# Patient Record
Sex: FEMALE | Race: WHITE | Employment: UNEMPLOYED | URBAN - METROPOLITAN AREA
[De-identification: names, ages, dates, MRNs, and addresses within clinical notes are randomized per-mention and may not be internally consistent; named-entity substitution may affect disease eponyms.]

---

## 2017-01-03 DIAGNOSIS — M62.838 MUSCLE SPASM: ICD-10-CM

## 2017-01-03 NOTE — TELEPHONE ENCOUNTER
Pt would like a refill on the following medication. Please assist.    Requested Prescriptions     Pending Prescriptions Disp Refills    carisoprodol (SOMA) 350 mg tablet 60 Tab 0     Sig: Take 1 Tab by mouth every eight (8) hours as needed for Muscle Spasm(s). Max Daily Amount: 1,050 mg. Indications:  MUSCLE SPASM

## 2017-01-04 RX ORDER — CARISOPRODOL 350 MG/1
350 TABLET ORAL
Qty: 42 TAB | Refills: 0 | Status: SHIPPED | OUTPATIENT
Start: 2017-01-04 | End: 2017-02-07 | Stop reason: SDUPTHER

## 2017-01-05 ENCOUNTER — HOSPITAL ENCOUNTER (OUTPATIENT)
Dept: LAB | Age: 42
Discharge: HOME | End: 2017-01-05
Payer: MEDICAID

## 2017-01-05 DIAGNOSIS — E78.2 MIXED HYPERLIPIDEMIA: ICD-10-CM

## 2017-01-05 LAB
ANION GAP BLD CALC-SCNC: 8 MMOL/L (ref 3–18)
BUN SERPL-MCNC: 8 MG/DL (ref 7–18)
BUN/CREAT SERPL: 9 (ref 12–20)
CALCIUM SERPL-MCNC: 9 MG/DL (ref 8.5–10.1)
CHLORIDE SERPL-SCNC: 111 MMOL/L (ref 100–108)
CHOLEST SERPL-MCNC: 180 MG/DL
CO2 SERPL-SCNC: 24 MMOL/L (ref 21–32)
CREAT SERPL-MCNC: 0.89 MG/DL (ref 0.6–1.3)
GLUCOSE SERPL-MCNC: 79 MG/DL (ref 74–99)
HDLC SERPL-MCNC: 40 MG/DL (ref 40–60)
HDLC SERPL: 4.5 {RATIO} (ref 0–5)
LDLC SERPL CALC-MCNC: 109.4 MG/DL (ref 0–100)
LIPID PROFILE,FLP: ABNORMAL
POTASSIUM SERPL-SCNC: 4.3 MMOL/L (ref 3.5–5.5)
SODIUM SERPL-SCNC: 143 MMOL/L (ref 136–145)
TRIGL SERPL-MCNC: 153 MG/DL (ref ?–150)
VLDLC SERPL CALC-MCNC: 30.6 MG/DL

## 2017-01-05 PROCEDURE — 80061 LIPID PANEL: CPT | Performed by: NURSE PRACTITIONER

## 2017-01-05 PROCEDURE — 80048 BASIC METABOLIC PNL TOTAL CA: CPT | Performed by: NURSE PRACTITIONER

## 2017-01-05 PROCEDURE — 36415 COLL VENOUS BLD VENIPUNCTURE: CPT | Performed by: NURSE PRACTITIONER

## 2017-01-06 ENCOUNTER — TELEPHONE (OUTPATIENT)
Dept: FAMILY MEDICINE CLINIC | Age: 42
End: 2017-01-06

## 2017-01-06 NOTE — TELEPHONE ENCOUNTER
Call placed to Ms. Dhara Min to make her aware of most recent lab results. Discussed results. Continue with simvastatin as prescribed. She has no additional questions or concerns at this time.

## 2017-01-24 ENCOUNTER — HOSPITAL ENCOUNTER (OUTPATIENT)
Dept: LAB | Age: 42
Discharge: HOME | End: 2017-01-24
Payer: MEDICAID

## 2017-01-24 ENCOUNTER — OFFICE VISIT (OUTPATIENT)
Dept: OBGYN CLINIC | Age: 42
End: 2017-01-24

## 2017-01-24 VITALS
HEIGHT: 65 IN | TEMPERATURE: 97.1 F | BODY MASS INDEX: 27.49 KG/M2 | WEIGHT: 165 LBS | SYSTOLIC BLOOD PRESSURE: 115 MMHG | DIASTOLIC BLOOD PRESSURE: 76 MMHG | HEART RATE: 77 BPM

## 2017-01-24 DIAGNOSIS — R10.2 PELVIC PAIN: ICD-10-CM

## 2017-01-24 DIAGNOSIS — N93.9 VAGINAL BLEEDING: ICD-10-CM

## 2017-01-24 DIAGNOSIS — R10.2 PELVIC PAIN: Primary | ICD-10-CM

## 2017-01-24 DIAGNOSIS — S39.93XA VAGINAL INJURY, INITIAL ENCOUNTER: ICD-10-CM

## 2017-01-24 LAB
BILIRUB UR QL STRIP: NEGATIVE
GLUCOSE UR-MCNC: NEGATIVE MG/DL
KETONES P FAST UR STRIP-MCNC: NEGATIVE MG/DL
PH UR STRIP: 5.5 [PH] (ref 4.6–8)
PROT UR QL STRIP: NEGATIVE MG/DL
SP GR UR STRIP: 1.01 (ref 1–1.03)
UA UROBILINOGEN AMB POC: NORMAL (ref 0.2–1)
URINALYSIS CLARITY POC: CLEAR
URINALYSIS COLOR POC: YELLOW
URINE BLOOD POC: NORMAL
URINE LEUKOCYTES POC: NORMAL
URINE NITRITES POC: NEGATIVE

## 2017-01-24 PROCEDURE — 87086 URINE CULTURE/COLONY COUNT: CPT | Performed by: OBSTETRICS & GYNECOLOGY

## 2017-01-24 NOTE — LETTER
NOTIFICATION RETURN TO WORK / SCHOOL 
 
1/24/2017 11:48 AM 
 
Ms. Flip Manley [address] [facility] [address] [id number] To Whom It May Concern: 
 
Flip Manley was seen today at  [address] [place] [address]. She will return to work/school on: January 25, 2017. If there are questions or concerns please have the patient contact our office. Sincerely, Orlando Hernandez, DO
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
## 2017-01-26 LAB
BACTERIA SPEC CULT: NORMAL
SERVICE CMNT-IMP: NORMAL

## 2017-02-07 ENCOUNTER — OFFICE VISIT (OUTPATIENT)
Dept: FAMILY MEDICINE CLINIC | Age: 42
End: 2017-02-07

## 2017-02-07 VITALS
BODY MASS INDEX: 27.29 KG/M2 | HEIGHT: 66 IN | OXYGEN SATURATION: 100 % | SYSTOLIC BLOOD PRESSURE: 109 MMHG | TEMPERATURE: 97.9 F | DIASTOLIC BLOOD PRESSURE: 76 MMHG | WEIGHT: 169.8 LBS | HEART RATE: 80 BPM | RESPIRATION RATE: 17 BRPM

## 2017-02-07 DIAGNOSIS — M25.511 ACUTE PAIN OF RIGHT SHOULDER: Primary | ICD-10-CM

## 2017-02-07 DIAGNOSIS — M62.838 MUSCLE SPASM: ICD-10-CM

## 2017-02-07 DIAGNOSIS — M62.838 TRAPEZIUS MUSCLE SPASM: ICD-10-CM

## 2017-02-07 DIAGNOSIS — F17.210 CIGARETTE SMOKER: ICD-10-CM

## 2017-02-07 DIAGNOSIS — M79.7 FIBROMYALGIA: ICD-10-CM

## 2017-02-07 DIAGNOSIS — G43.909 MIGRAINE WITHOUT STATUS MIGRAINOSUS, NOT INTRACTABLE, UNSPECIFIED MIGRAINE TYPE: ICD-10-CM

## 2017-02-07 DIAGNOSIS — F43.21 GRIEVING: ICD-10-CM

## 2017-02-07 DIAGNOSIS — G89.29 OTHER CHRONIC PAIN: ICD-10-CM

## 2017-02-07 RX ORDER — CARISOPRODOL 350 MG/1
350 TABLET ORAL
Qty: 42 TAB | Refills: 0 | Status: SHIPPED | OUTPATIENT
Start: 2017-02-07 | End: 2017-02-22 | Stop reason: ALTCHOICE

## 2017-02-07 RX ORDER — BUTALBITAL, ACETAMINOPHEN AND CAFFEINE 50; 325; 40 MG/1; MG/1; MG/1
1 TABLET ORAL
Qty: 20 TAB | Refills: 0 | Status: SHIPPED | OUTPATIENT
Start: 2017-02-07 | End: 2017-03-17 | Stop reason: SDUPTHER

## 2017-02-07 NOTE — PATIENT INSTRUCTIONS
Shoulder Stretches: Exercises  Your Care Instructions  Here are some examples of exercises for your shoulder. Start each exercise slowly. Ease off the exercise if you start to have pain. Your doctor or physical therapist will tell you when you can start these exercises and which ones will work best for you. How to do the exercises  Note: These exercises should cause you to feel a gentle stretch, but no pain. Shoulder stretch    1.  a doorway and place one arm against the door frame. Your elbow should be a little higher than your shoulder. 2. Relax your shoulders as you lean forward, allowing your chest and shoulder muscles to stretch. You can also turn your body slightly away from your arm to stretch the muscles even more. 3. Hold for 15 to 30 seconds. 4. Repeat 2 to 4 times with each arm. Shoulder and chest stretch    Shoulder and chest stretch  1. While sitting, relax your upper body so you slump slightly in your chair. 2. As you breathe in, straighten your back and open your arms out to the sides. 3. Gently pull your shoulder blades back and downward. 4. Hold for 15 to 30 seconds as your breathe normally. 5. Repeat 2 to 4 times. Overhead stretch    1. Reach up over your head with both arms. 2. Hold for 15 to 30 seconds. 3. Repeat 2 to 4 times. Follow-up care is a key part of your treatment and safety. Be sure to make and go to all appointments, and call your doctor if you are having problems. It's also a good idea to know your test results and keep a list of the medicines you take. Where can you learn more? Go to http://romario-clover.info/. Enter S254 in the search box to learn more about \"Shoulder Stretches: Exercises. \"  Current as of: May 23, 2016  Content Version: 11.1  © 9933-5760 [employer], [employer]ob. Care instructions adapted under license by [employer] (which disclaims liability or warranty for this information).  If you have questions about a medical condition or this instruction, always ask your healthcare professional. [place] [address] any warranty or liability for your use of this information.

## 2017-02-07 NOTE — MR AVS SNAPSHOT
Visit Information Date & Time Provider Department Dept. Phone Encounter #  
 2/7/2017  1:30 PM [place] [id number] [address] [phone] Follow-up Instructions Return if symptoms worsen or fail to improve, for keep scheduled appointment. Your Appointments 3/24/2017  1:20 PM  
Follow Up with Chao Garcia MD  
[address] [place] [address]) Appt Note: 3mon f/u  
 [address] [address]  
[phone]  
  
   
 [facility] [address] 4/14/2017  2:30 PM  
ROUTINE CARE with [place] Carter NP [address] [address]) Appt Note: Return in about 4 months (around 4/15/2017) for routine office visit [facility] [address] [facility] [address] [facility]  
  
   
 [facility]  
  
    
 6/9/2017 11:00 AM  
ANNUAL with Kerri Espinoza DO  
[facility] ([address] [place] [address]) Appt Note: annual  
 [facility] [address]  
[phone]  
  
   
 [facility] 83 [phone] Upcoming Health Maintenance Date Due Pneumococcal 19-64 Medium Risk (1 of 1 - PPSV23) 8/26/1995 DTaP/Tdap/Td series (1 - Tdap) 11/17/2015 PAP AKA CERVICAL CYTOLOGY 7/1/2019 Allergies as of 2/7/2017  Review Complete On: 2/7/2017 By: Yuan Carter NP Severity Noted Reaction Type Reactions Relpax [Eletriptan Hbr] High 02/25/2013    Anaphylaxis Relpax [Eletriptan Hbr]  04/24/2014    Sneezing Ultram [Tramadol]  02/25/2013    Nausea Only Ultram [Tramadol]  04/24/2014    Nausea and Vomiting Current Immunizations  Reviewed on 10/12/2016 Name Date Td, Adsorbed PF 11/16/2015  1:25 PM  
  
 Not reviewed this visit You Were Diagnosed With   
  
 Codes Comments Acute pain of right shoulder    -  Primary ICD-10-CM: M25.511 ICD-9-CM: 719.41   
 Trapezius muscle spasm     ICD-10-CM: X53.545 ICD-9-CM: 728.85 Muscle spasm     ICD-10-CM: H12.948 ICD-9-CM: 728.85 Grieving     ICD-10-CM: F45.22 ICD-9-CM: 309.0 Cigarette smoker     ICD-10-CM: F17.210 ICD-9-CM: 305.1 Migraine without status migrainosus, not intractable, unspecified migraine type     ICD-10-CM: G43.909 ICD-9-CM: 346.90 Other chronic pain     ICD-10-CM: G89.29 ICD-9-CM: 338.29 Fibromyalgia     ICD-10-CM: M79.7 ICD-9-CM: 729.1 Vitals BP Pulse Temp Resp Height(growth percentile) Weight(growth percentile) 109/76 (BP 1 Location: Left arm, BP Patient Position: Sitting) 80 97.9 °F (36.6 °C) (Oral) 17 5' 5.5\" (1.664 m) 169 lb 12.8 oz (77 kg) LMP SpO2 BMI OB Status Smoking Status 09/28/2016 100% 27.83 kg/m2 Hysterectomy Current Every Day Smoker BMI and BSA Data Body Mass Index Body Surface Area  
 27.83 kg/m 2 1.89 m 2 Preferred Pharmacy Pharmacy Name Phone [facility] [address], [address] [place] [address] [phone] Your Updated Medication List  
  
   
This list is accurate as of: 2/7/17  2:38 PM.  Always use your most recent med list.  
  
  
  
  
 albuterol 90 mcg/actuation inhaler Commonly known as:  PROVENTIL HFA, VENTOLIN HFA, PROAIR HFA Take 2 Puffs by inhalation every four (4) hours as needed for Wheezing. butalbital-acetaminophen-caffeine -40 mg per tablet Commonly known as:  Tila Riddles Take 1 Tab by mouth every six (6) hours as needed for Pain or Headache.  
  
 carisoprodol 350 mg tablet Commonly known as:  SOMA Take 1 Tab by mouth every eight (8) hours as needed for Muscle Spasm(s). Max Daily Amount: 1,050 mg. Indications: MUSCLE SPASM  
  
 docusate sodium 100 mg capsule Commonly known as:  Lara Antis Take 1 Cap by mouth two (2) times a day. fluticasone-salmeterol 250-50 mcg/dose diskus inhaler Commonly known as:  ADVAIR  
 Take 1 Puff by inhalation two (2) times a day. KlonoPIN 0.5 mg tablet Generic drug:  clonazePAM  
Take  by mouth nightly as needed. naproxen 500 mg tablet Commonly known as:  NAPROSYN Take 1 Tab by mouth two (2) times daily (with meals). pantoprazole 40 mg tablet Commonly known as:  PROTONIX Take 1 Tab by mouth daily. simvastatin 20 mg tablet Commonly known as:  ZOCOR Take 1 Tab by mouth nightly. topiramate 200 mg tablet Commonly known as:  TOPAMAX TAKE ONE TABLET BY MOUTH TWICE DAILY  Indications: MIGRAINE PREVENTION  
  
 venlafaxine- mg capsule Commonly known as:  EFFEXOR-XR Take 150 mg by mouth daily. Prescriptions Printed Refills  
 carisoprodol (SOMA) 350 mg tablet 0 Sig: Take 1 Tab by mouth every eight (8) hours as needed for Muscle Spasm(s). Max Daily Amount: 1,050 mg. Indications: MUSCLE SPASM Class: Print Route: Oral  
 butalbital-acetaminophen-caffeine (FIORICET, ESGIC) -40 mg per tablet 0 Sig: Take 1 Tab by mouth every six (6) hours as needed for Pain or Headache. Class: Print Route: Oral  
  
We Performed the Following NE SMOKING AND TOBACCO USE CESSATION 3 - 10 MINUTES [04946 CPT(R)] REFERRAL TO PAIN MANAGEMENT [OYF473 Custom] Comments:  
 Please evaluate 36 y/o  patient for chronic pain. Follow-up Instructions Return if symptoms worsen or fail to improve, for keep scheduled appointment. Referral Information Referral ID Referred By Referred To  
  
 [id number] Shaylee KENDALL [address] [facility]   
   [address] [place] [address] Phone: [phone] Fax: [phone] Visits Status Start Date End Date 1 New Request 2/7/17 2/7/18 If your referral has a status of pending review or denied, additional information will be sent to support the outcome of this decision. Patient Instructions Shoulder Stretches: Exercises Your Care Instructions Here are some examples of exercises for your shoulder. Start each exercise slowly. Ease off the exercise if you start to have pain. Your doctor or physical therapist will tell you when you can start these exercises and which ones will work best for you. How to do the exercises Note: These exercises should cause you to feel a gentle stretch, but no pain. Shoulder stretch 1.  a doorway and place one arm against the door frame. Your elbow should be a little higher than your shoulder. 2. Relax your shoulders as you lean forward, allowing your chest and shoulder muscles to stretch. You can also turn your body slightly away from your arm to stretch the muscles even more. 3. Hold for 15 to 30 seconds. 4. Repeat 2 to 4 times with each arm. Shoulder and chest stretch Shoulder and chest stretch 1. While sitting, relax your upper body so you slump slightly in your chair. 2. As you breathe in, straighten your back and open your arms out to the sides. 3. Gently pull your shoulder blades back and downward. 4. Hold for 15 to 30 seconds as your breathe normally. 5. Repeat 2 to 4 times. Overhead stretch 1. Reach up over your head with both arms. 2. Hold for 15 to 30 seconds. 3. Repeat 2 to 4 times. Follow-up care is a key part of your treatment and safety. Be sure to make and go to all appointments, and call your doctor if you are having problems. It's also a good idea to know your test results and keep a list of the medicines you take. Where can you learn more? Go to http://romario-clover.info/. Enter S254 in the search box to learn more about \"Shoulder Stretches: Exercises. \" Current as of: May 23, 2016 Content Version: 11.1 © 7000-5130 [employer], HelpingDoc.  Care instructions adapted under license by [employer] (which disclaims liability or warranty for this information). If you have questions about a medical condition or this instruction, always ask your healthcare professional. [address] any warranty or liability for your use of this information. Introducing [address]! Ene Calvo introduces IntelliWheels patient portal. Now you can access parts of your medical record, email your doctor's office, and request medication refills online. 1. In your internet browser, go to [email or web address] 2. Click on the First Time User? Click Here link in the Sign In box. You will see the New Member Sign Up page. 3. Enter your IntelliWheels Access Code exactly as it appears below. You will not need to use this code after youve completed the sign-up process. If you do not sign up before the expiration date, you must request a new code. · IntelliWheels Access Code: [email or web address] Expires: 5/8/2017 12:58 PM 
 
4. Enter the last four digits of your Social Security Number (xxxx) and Date of Birth (mm/dd/yyyy) as indicated and click Submit. You will be taken to the next sign-up page. 5. Create a IntelliWheels ID. This will be your IntelliWheels login ID and cannot be changed, so think of one that is secure and easy to remember. 6. Create a IntelliWheels password. You can change your password at any time. 7. Enter your Password Reset Question and Answer. This can be used at a later time if you forget your password. 8. Enter your e-mail address. You will receive e-mail notification when new information is available in [address] 9. Click Sign Up. You can now view and download portions of your medical record. 10. Click the Download Summary menu link to download a portable copy of your medical information. If you have questions, please visit the Frequently Asked Questions section of the IntelliWheels website. Remember, IntelliWheels is NOT to be used for urgent needs. For medical emergencies, dial 911. Now available from your iPhone and Android! Please provide this summary of care documentation to your next provider. Your primary care clinician is listed as Beatrice Mcdonald. If you have any questions after today's visit, please call [phone].

## 2017-02-07 NOTE — PROGRESS NOTES
Berna Garcia is a 37 y.o. female presents today for pain to right shoulder resulting from fall in bath tub. Patient request medication refill of Floricet and Soma.
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
## 2017-02-22 ENCOUNTER — OFFICE VISIT (OUTPATIENT)
Dept: FAMILY MEDICINE CLINIC | Age: 42
End: 2017-02-22

## 2017-02-22 VITALS
RESPIRATION RATE: 20 BRPM | WEIGHT: 172.6 LBS | BODY MASS INDEX: 27.74 KG/M2 | SYSTOLIC BLOOD PRESSURE: 103 MMHG | HEART RATE: 86 BPM | OXYGEN SATURATION: 100 % | TEMPERATURE: 96.7 F | HEIGHT: 66 IN | DIASTOLIC BLOOD PRESSURE: 70 MMHG

## 2017-02-22 DIAGNOSIS — R20.2 NUMBNESS AND TINGLING: ICD-10-CM

## 2017-02-22 DIAGNOSIS — R20.0 NUMBNESS AND TINGLING: ICD-10-CM

## 2017-02-22 DIAGNOSIS — M62.830 BACK SPASM: ICD-10-CM

## 2017-02-22 DIAGNOSIS — G43.909 MIGRAINE WITHOUT STATUS MIGRAINOSUS, NOT INTRACTABLE, UNSPECIFIED MIGRAINE TYPE: Primary | ICD-10-CM

## 2017-02-22 DIAGNOSIS — M79.7 FIBROMYALGIA: ICD-10-CM

## 2017-02-22 DIAGNOSIS — F17.210 CIGARETTE SMOKER: ICD-10-CM

## 2017-02-22 DIAGNOSIS — G81.94 HEMIPARESIS, LEFT (HCC): ICD-10-CM

## 2017-02-22 RX ORDER — METHOCARBAMOL 500 MG/1
500 TABLET, FILM COATED ORAL
Qty: 30 TAB | Refills: 0 | Status: SHIPPED | OUTPATIENT
Start: 2017-02-22 | End: 2017-05-17 | Stop reason: SDUPTHER
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
## 2017-02-22 NOTE — PROGRESS NOTES
Doreen Bella is a 37 y.o. female presents today for hospital follow up patient exhibits signs of left sided weakness and dragging of feet when walking. She also reports that her head been hurting her continuously x 1 month.

## 2017-02-22 NOTE — MR AVS SNAPSHOT
Visit Information Date & Time Provider Department Dept. Phone Encounter #  
 2/22/2017  1:30 [place] [id number] [address] [phone] Follow-up Instructions Return if symptoms worsen or fail to improve. Your Appointments 3/24/2017  1:20 PM  
Follow Up with Alvino Wilson MD  
[facility]) Appt Note: 3mon f/u  
 [address] [facility] jennifer [facility] [address]  
[phone]  
  
   
 [place] [phone] 4/14/2017  2:30 PM  
ROUTINE CARE with Otis Alston NP [address] [address] [facility]) Appt Note: Return in about 4 months (around 4/15/2017) for routine office visit [address] [facility] [address]  
  
   
 [address]  
  
    
 6/9/2017 11:00 AM  
ANNUAL with Valentin Castellanos DO  
[facility] ([facility]) Appt Note: annual  
 [address] [facility] [address] [address]  
[phone]  
  
   
 [address] [facility] [phone] Upcoming Health Maintenance Date Due Pneumococcal 19-64 Medium Risk (1 of 1 - PPSV23) 8/26/1995 DTaP/Tdap/Td series (1 - Tdap) 11/17/2015 PAP AKA CERVICAL CYTOLOGY 7/1/2019 Allergies as of 2/22/2017  Review Complete On: 2/22/2017 By: Leticia Stanley LPN Severity Noted Reaction Type Reactions Relpax [Eletriptan Hbr] High 02/25/2013    Anaphylaxis Relpax [Eletriptan Hbr]  04/24/2014    Sneezing Ultram [Tramadol]  02/25/2013    Nausea Only Ultram [Tramadol]  04/24/2014    Nausea and Vomiting Current Immunizations  Reviewed on 10/12/2016 Name Date Td, Adsorbed PF 11/16/2015  1:25 PM  
  
 Not reviewed this visit You Were Diagnosed With   
  
 Codes Comments  Migraine without status migrainosus, not intractable, unspecified migraine type    -  Primary ICD-10-CM: W77.327 ICD-9-CM: 346.90 Hemiparesis, left (HCC)     ICD-10-CM: G81.94 
ICD-9-CM: 342.90 Numbness and tingling     ICD-10-CM: R20.0, R20.2 ICD-9-CM: 782.0 Fibromyalgia     ICD-10-CM: M79.7 ICD-9-CM: 729.1 Back spasm     ICD-10-CM: H32.796 ICD-9-CM: 724.8 Cigarette smoker     ICD-10-CM: F17.210 ICD-9-CM: 305.1 Vitals BP  
  
  
  
  
  
 103/70 (BP 1 Location: Right arm, BP Patient Position: Sitting) BMI and BSA Data Body Mass Index Body Surface Area  
 28.29 kg/m 2 1.9 m 2 Preferred Pharmacy Pharmacy Name Phone [facility] [address], [address] [place] [address] [phone] Your Updated Medication List  
  
   
This list is accurate as of: 2/22/17  2:02 PM.  Always use your most recent med list.  
  
  
  
  
 Tabby Griffiths 250-50 mcg/dose diskus inhaler Generic drug:  fluticasone-salmeterol INHALE ONE DOSE BY MOUTH TWICE DAILY  
  
 albuterol 90 mcg/actuation inhaler Commonly known as:  PROVENTIL HFA, VENTOLIN HFA, PROAIR HFA Take 2 Puffs by inhalation every four (4) hours as needed for Wheezing. butalbital-acetaminophen-caffeine -40 mg per tablet Commonly known as:  Domingo [place] Take 1 Tab by mouth every six (6) hours as needed for Pain or Headache. docusate sodium 100 mg capsule Commonly known as:  Micaela Eisenmenger Take 1 Cap by mouth two (2) times a day. KlonoPIN 0.5 mg tablet Generic drug:  clonazePAM  
Take  by mouth nightly as needed. methocarbamol 500 mg tablet Commonly known as:  ROBAXIN Take 1 Tab by mouth four (4) times daily as needed. pantoprazole 40 mg tablet Commonly known as:  PROTONIX Take 1 Tab by mouth daily. simvastatin 20 mg tablet Commonly known as:  ZOCOR Take 1 Tab by mouth nightly. topiramate 200 mg tablet Commonly known as:  TOPAMAX TAKE ONE TABLET BY MOUTH TWICE DAILY  Indications: MIGRAINE PREVENTION  
  
 venlafaxine- mg capsule Commonly known as:  EFFEXOR-XR Take 150 mg by mouth daily. Prescriptions Sent to Pharmacy Refills  
 methocarbamol (ROBAXIN) 500 mg tablet 0 Sig: Take 1 Tab by mouth four (4) times daily as needed. Class: Normal  
 Pharmacy: [id number], [address] Ph #: [phone] Route: Oral  
  
We Performed the Following [place] SMOKING AND TOBACCO USE CESSATION 3 - 10 MINUTES [915[address] CPT(R)] Follow-up Instructions Return if symptoms worsen or fail to improve. Patient Instructions Stroke: Care Instructions Your Care Instructions You have had a stroke. This means that the blood flow to a part of your brain was blocked for some time, which damages the nerve cells in that part of the brain. The part of your body controlled by that part of your brain may not function properly now. The brain is an amazing organ that can heal itself to some degree. The stroke you had damaged part of your brain. But other parts of your brain may take over in some way for the damaged areas. You have already started this process. Your doctor will talk with you about what you can do to prevent another stroke. High blood pressure, high cholesterol, and diabetes are all risk factors for stroke. If you have any of these conditions, work with your doctor to make sure they are under control. Other risk factors for stroke include being overweight, smoking, and not getting regular exercise. Going home may be hard for you and your family. The more you can try to do for yourself, the better. Remember to take each day one at a time. Follow-up care is a key part of your treatment and safety. Be sure to make and go to all appointments, and call your doctor if you are having problems.  It's also a good idea to know your test results and keep a list of the medicines you take. How can you care for yourself at home? · Enter a stroke rehabilitation (rehab) program, if your doctor recommends it. Physical, speech, and occupational therapies can help you manage bathing, dressing, eating, and other basics of daily living. · Do not drive until your doctor says it is okay. · It is normal to feel sad or depressed after a stroke. If these feelings last, talk to your doctor. · If you are having problems with urine leakage, go to the bathroom at regular times, including when you first wake up and at bedtime. Also, limit fluids after dinner. · If you are constipated, drink plenty of fluids, enough so that your urine is light yellow or clear like water. If you have kidney, heart, or liver disease and have to limit fluids, talk with your doctor before you increase the amount of fluids you drink. Set up a regular time for using the toilet. If you continue to have constipation, your doctor may suggest using a bulking agent, such as Metamucil, or a stool softener, laxative, or enema. Medicines · Take your medicines exactly as prescribed. Call your doctor if you think you are having a problem with your medicine. You may be taking several medicines. ACE (angiotensin-converting enzyme) inhibitors, angiotensin II receptor blockers (ARBs), beta-blockers, diuretics (water pills), and calcium channel blockers control your blood pressure. Statins help lower cholesterol. Your doctor may also prescribe medicines for depression, pain, sleep problems, anxiety, or agitation. · If your doctor has given you a blood thinner to prevent another stroke, be sure you get instructions about how to take your medicine safely. Blood thinners can cause serious bleeding problems. · Do not take any over-the-counter medicines or herbal products without talking to your doctor first. 
· If you take birth control pills or hormone therapy, talk to your doctor about whether they are right for you. For family members and caregivers · Make the home safe. Set up a room so that your loved one does not have to climb stairs. Be sure the bathroom is on the same floor. Move throw rugs and furniture that could cause falls. Make sure that the lighting is good. Put grab bars and seats in tubs and showers. · Find out what your loved one can do and what he or she needs help with. Try not to do things for your loved one that your loved one can do on his or her own. Help him or her learn and practice new skills. · Visit and talk with your loved one often. Try doing activities together that you both enjoy, such as playing cards or board games. Keep in touch with your loved one's friends as much as you can. Encourage them to visit. · Take care of yourself. Do not try to do everything yourself. Ask other family members to help. Eat well, get enough rest, and take time to do things that you enjoy. Keep up with your own doctor visits, and make sure to take your medicines regularly. Get out of the house as much as you can. Join a local support group. Find out if you qualify for home health care visits to help with rehab or for adult day care. When should you call for help? Call 911 anytime you think you may need emergency care. For example, call if: 
· You have signs of another stroke. These may include: 
¨ Sudden numbness, tingling, weakness, or loss of movement in your face, arm, or leg, especially on only one side of your body. ¨ Sudden vision changes. ¨ Sudden trouble speaking. ¨ Sudden confusion or trouble understanding simple statements. ¨ Sudden problems with walking or balance. ¨ A sudden, severe headache that is different from past headaches. Call 911 even if these symptoms go away in a few minutes. Call your doctor now or seek immediate medical care if: 
· You have new symptoms that may be related to your stroke, such as falls or trouble swallowing. Watch closely for changes in your health, and be sure to contact your doctor if you have any problems. Where can you learn more? Go to http://romario-clover.info/. Enter I411 in the search box to learn more about \"Stroke: Care Instructions. \" Current as of: June 4, 2016 Content Version: 11.1 © 8224-0167 Exam18. Care instructions adapted under license by [employer] (which disclaims liability or warranty for this information). If you have questions about a medical condition or this instruction, always ask your healthcare professional. [address] any warranty or liability for your use of this information. Introducing [facility]! Yashira Eli introduces [employer] patient portal. Now you can access parts of your medical record, email your doctor's office, and request medication refills online. 1. In your internet browser, go to [email or web address] 2. Click on the First Time User? Click Here link in the Sign In box. You will see the New Member Sign Up page. 3. Enter your [employer] Access Code exactly as it appears below. You will not need to use this code after youve completed the sign-up process. If you do not sign up before the expiration date, you must request a new code. · [employer] Access Code: [email or web address] Expires: 5/8/2017 12:58 PM 
 
4. Enter the last four digits of your Social Security Number (xxxx) and Date of Birth (mm/dd/yyyy) as indicated and click Submit. You will be taken to the next sign-up page. 5. Create a Nomad Mobile Guidest ID. This will be your [employer] login ID and cannot be changed, so think of one that is secure and easy to remember. 6. Create a [employer] Guid[employer] password. You can change your password at any time. 7. Enter your Password Reset Question and Answer. This can be used at a later time if you forget your password. 8. Enter your e-mail address.  You will receive e-mail notification when new information is available in Mevvy. 9. Click Sign Up. You can now view and download portions of your medical record. 10. Click the Download Summary menu link to download a portable copy of your medical information. If you have questions, please visit the Frequently Asked Questions section of the Mevvy website. Remember, Mevvy is NOT to be used for urgent needs. For medical emergencies, dial 911. Now available from your iPhone and Android! Please provide this summary of care documentation to your next provider. Your primary care clinician is listed as Nico Dominguez. If you have any questions after today's visit, please call [phone].

## 2017-02-22 NOTE — PATIENT INSTRUCTIONS
Stroke: Care Instructions  Your Care Instructions    You have had a stroke. This means that the blood flow to a part of your brain was blocked for some time, which damages the nerve cells in that part of the brain. The part of your body controlled by that part of your brain may not function properly now. The brain is an amazing organ that can heal itself to some degree. The stroke you had damaged part of your brain. But other parts of your brain may take over in some way for the damaged areas. You have already started this process. Your doctor will talk with you about what you can do to prevent another stroke. High blood pressure, high cholesterol, and diabetes are all risk factors for stroke. If you have any of these conditions, work with your doctor to make sure they are under control. Other risk factors for stroke include being overweight, smoking, and not getting regular exercise. Going home may be hard for you and your family. The more you can try to do for yourself, the better. Remember to take each day one at a time. Follow-up care is a key part of your treatment and safety. Be sure to make and go to all appointments, and call your doctor if you are having problems. It's also a good idea to know your test results and keep a list of the medicines you take. How can you care for yourself at home? · Enter a stroke rehabilitation (rehab) program, if your doctor recommends it. Physical, speech, and occupational therapies can help you manage bathing, dressing, eating, and other basics of daily living. · Do not drive until your doctor says it is okay. · It is normal to feel sad or depressed after a stroke. If these feelings last, talk to your doctor. · If you are having problems with urine leakage, go to the bathroom at regular times, including when you first wake up and at bedtime. Also, limit fluids after dinner.   · If you are constipated, drink plenty of fluids, enough so that your urine is light yellow or clear like water. If you have kidney, heart, or liver disease and have to limit fluids, talk with your doctor before you increase the amount of fluids you drink. Set up a regular time for using the toilet. If you continue to have constipation, your doctor may suggest using a bulking agent, such as Metamucil, or a stool softener, laxative, or enema. Medicines  · Take your medicines exactly as prescribed. Call your doctor if you think you are having a problem with your medicine. You may be taking several medicines. ACE (angiotensin-converting enzyme) inhibitors, angiotensin II receptor blockers (ARBs), beta-blockers, diuretics (water pills), and calcium channel blockers control your blood pressure. Statins help lower cholesterol. Your doctor may also prescribe medicines for depression, pain, sleep problems, anxiety, or agitation. · If your doctor has given you a blood thinner to prevent another stroke, be sure you get instructions about how to take your medicine safely. Blood thinners can cause serious bleeding problems. · Do not take any over-the-counter medicines or herbal products without talking to your doctor first.  · If you take birth control pills or hormone therapy, talk to your doctor about whether they are right for you. For family members and caregivers  · Make the home safe. Set up a room so that your loved one does not have to climb stairs. Be sure the bathroom is on the same floor. Move throw rugs and furniture that could cause falls. Make sure that the lighting is good. Put grab bars and seats in tubs and showers. · Find out what your loved one can do and what he or she needs help with. Try not to do things for your loved one that your loved one can do on his or her own. Help him or her learn and practice new skills. · Visit and talk with your loved one often. Try doing activities together that you both enjoy, such as playing cards or board games.  Keep in touch with your loved one's friends as much as you can. Encourage them to visit. · Take care of yourself. Do not try to do everything yourself. Ask other family members to help. Eat well, get enough rest, and take time to do things that you enjoy. Keep up with your own doctor visits, and make sure to take your medicines regularly. Get out of the house as much as you can. Join a local support group. Find out if you qualify for home health care visits to help with rehab or for adult day care. When should you call for help? Call 911 anytime you think you may need emergency care. For example, call if:  · You have signs of another stroke. These may include:  ¨ Sudden numbness, tingling, weakness, or loss of movement in your face, arm, or leg, especially on only one side of your body. ¨ Sudden vision changes. ¨ Sudden trouble speaking. ¨ Sudden confusion or trouble understanding simple statements. ¨ Sudden problems with walking or balance. ¨ A sudden, severe headache that is different from past headaches. Call 911 even if these symptoms go away in a few minutes. Call your doctor now or seek immediate medical care if:  · You have new symptoms that may be related to your stroke, such as falls or trouble swallowing. Watch closely for changes in your health, and be sure to contact your doctor if you have any problems. Where can you learn more? Go to http://romario-clover.info/. Enter X117 in the search box to learn more about \"Stroke: Care Instructions. \"  Current as of: June 4, 2016  Content Version: 11.1  © 0714-3337 Healthwise[employer]. Care instructions adapted under license by [employer] (which disclaims liability or warranty for this information). If you have questions about a medical condition or this instruction, always ask your healthcare professional. [address] any warranty or liability for your use of this information.

## 2017-02-24 ENCOUNTER — OFFICE VISIT (OUTPATIENT)
Dept: NEUROLOGY | Age: 42
End: 2017-02-24

## 2017-02-24 VITALS
WEIGHT: 173.6 LBS | OXYGEN SATURATION: 98 % | DIASTOLIC BLOOD PRESSURE: 80 MMHG | HEART RATE: 76 BPM | HEIGHT: 65 IN | TEMPERATURE: 98 F | SYSTOLIC BLOOD PRESSURE: 152 MMHG | BODY MASS INDEX: 28.92 KG/M2

## 2017-02-24 DIAGNOSIS — G43.009 MIGRAINE WITHOUT AURA AND WITHOUT STATUS MIGRAINOSUS, NOT INTRACTABLE: Primary | ICD-10-CM

## 2017-02-24 DIAGNOSIS — R53.1 LEFT-SIDED WEAKNESS: ICD-10-CM

## 2017-02-24 DIAGNOSIS — F17.210 CIGARETTE SMOKER: ICD-10-CM

## 2017-02-24 RX ORDER — NORTRIPTYLINE HYDROCHLORIDE 25 MG/1
25 CAPSULE ORAL
Qty: 60 CAP | Refills: 5 | Status: SHIPPED | OUTPATIENT
Start: 2017-02-24 | End: 2017-03-03 | Stop reason: SDUPTHER

## 2017-02-24 NOTE — LETTER
NOTIFICATION OF RETURN TO WORK / SCHOOL 
 
2/24/2017 2:40 PM 
 
Ms. Doreen Bella [address] [id number] Kristi Pinon To Whom It May Concern: 
 
Doreen Bella was under the care of [address] 2/24/2017. She will be able to return to work/school on 2/24/2017 
 with work from home till further testing done. She will be seen back here in 1 week. If there are questions or concerns please have the patient contact our office.  
 
Sincerely, 
 
 
Carmen Mcfarlane MD

## 2017-02-24 NOTE — PROGRESS NOTES
Re:  Lana Rebekahroland up visit     2/24/2017 2:29 PM        SSN: [id number]    Subjective:   Doreen Bella returns for follow up of her migraines. She started to have a headache on February 4. She sates prior to that the pain was less intense. She's recently started to get woken up in the middle of the night with pain. This is new in the last several weeks. The pain is mostly right sided. She went to [facility] because of weakness of the left side with some sensory changes. Medications:    Current Outpatient Prescriptions   Medication Sig Dispense Refill    ADVAIR DISKUS 250-50 mcg/dose diskus inhaler INHALE ONE DOSE BY MOUTH TWICE DAILY 1 Inhaler 3    butalbital-acetaminophen-caffeine (FIORICET, ESGIC) -40 mg per tablet Take 1 Tab by mouth every six (6) hours as needed for Pain or Headache. 20 Tab 0    simvastatin (ZOCOR) 20 mg tablet Take 1 Tab by mouth nightly. 30 Tab 2    topiramate (TOPAMAX) 200 mg tablet TAKE ONE TABLET BY MOUTH TWICE DAILY  Indications: MIGRAINE PREVENTION 60 Tab 6    clonazePAM (KLONOPIN) 0.5 mg tablet Take  by mouth nightly as needed.  pantoprazole (PROTONIX) 40 mg tablet Take 1 Tab by mouth daily. 30 Tab 3    docusate sodium (COLACE) 100 mg capsule Take 1 Cap by mouth two (2) times a day. 60 Cap 0    venlafaxine-SR (EFFEXOR-XR) 150 mg capsule Take 150 mg by mouth daily.  albuterol (PROVENTIL HFA, VENTOLIN HFA, PROAIR HFA) 90 mcg/actuation inhaler Take 2 Puffs by inhalation every four (4) hours as needed for Wheezing. 1 Inhaler 2    methocarbamol (ROBAXIN) 500 mg tablet Take 1 Tab by mouth four (4) times daily as needed.  30 Tab 0       Vital signs:    Visit Vitals    /80    Pulse 76    Temp 98 °F (36.7 °C) (Oral)    Ht 5' 5\" (1.651 m)    Wt 78.7 kg (173 lb 9.6 oz)    LMP 09/28/2016    SpO2 98%    BMI 28.89 kg/m2       Review of Systems:   As above otherwise 11 point review of systems negative including; Constitutional no fever or chills  Skin denies rash or itching  HEENT  Denies tinnitus, hearing lose  Eyes denies diplopia vision lose  Respiratory denies sortness of breath  Cardiovascular denies chest pain, dyspnea on exertion  Gastrointestinal denies nausea, vomiting, diarrhea, constipation  Genitourinary denies incontinence  Musculoskeletal denies joint pain or swelling  Endocrine denies weight change  Hematology denies easy bruising or bleeding   Neurological as above in HPI      Patient Active Problem List   Diagnosis Code    Asthma J45.909    Migraine G43.909    Hypotension I95.9    Fibromyalgia M79.7    Bipolar depression ([facility] [address] F31.30    History of stroke Z86.73    Gastroesophageal reflux disease with esophagitis K21.0    Hyperlipidemia E78.5    Chronic constipation K59.09    Anemia D64.9    Chronic obstructive pulmonary disease with acute exacerbation (HCC) J44.1    Cigarette smoker F17.210         Objective: The patient is awake, alert, and oriented x 4. Fund of knowledge is adequate. Speech is fluent and memory is intact. Cranial Nerves: II - Visual fields are full to confrontation. III, IV, VI - Extraocular movements are intact. There is no nystagmus. V - Facial sensation is intact to pinprick. VII - Face is asymmetrical, she has decreased movement of the left face, but at rest the face is symmetrical.  VIII - Hearing is present. IX, X, XII - Palate is symmetrical.   XI - Shoulder shrugging and head turning intact  Motor: The patient has 2/5 strength throughout the left side . Tone is normal. Reflexes are 2+ and symmetrical. Plantars are down going. Gait is abnormal, she limps off of the left leg, but does not fall.     CBC:   Lab Results   Component Value Date/Time    WBC 9.4 10/24/2016 03:00 PM    RBC 4.49 10/24/2016 03:00 PM    HGB 12.3 10/24/2016 03:00 PM    HCT 39.0 10/24/2016 03:00 PM    PLATELET 589 25/70/5073 03:00 PM     BMP:   Lab Results   Component Value Date/Time Glucose 79 01/05/2017 12:02 PM    Sodium 143 01/05/2017 12:02 PM    Potassium 4.3 01/05/2017 12:02 PM    Chloride 111 01/05/2017 12:02 PM    CO2 24 01/05/2017 12:02 PM    BUN 8 01/05/2017 12:02 PM    Creatinine 0.89 01/05/2017 12:02 PM    Calcium 9.0 01/05/2017 12:02 PM     CMP:   Lab Results   Component Value Date/Time    Glucose 79 01/05/2017 12:02 PM    Sodium 143 01/05/2017 12:02 PM    Potassium 4.3 01/05/2017 12:02 PM    Chloride 111 01/05/2017 12:02 PM    CO2 24 01/05/2017 12:02 PM    BUN 8 01/05/2017 12:02 PM    Creatinine 0.89 01/05/2017 12:02 PM    Calcium 9.0 01/05/2017 12:02 PM    Anion gap 8 01/05/2017 12:02 PM    BUN/Creatinine ratio 9 01/05/2017 12:02 PM    Alk. phosphatase 86 09/23/2016 12:18 PM    Protein, total 7.1 09/23/2016 12:18 PM    Albumin 3.6 09/23/2016 12:18 PM    Globulin 3.5 09/23/2016 12:18 PM    A-G Ratio 1.0 09/23/2016 12:18 PM     Coagulation:   Lab Results   Component Value Date/Time    Prothrombin time 13.4 09/23/2016 12:18 PM    INR 1.1 09/23/2016 12:18 PM     Cardiac markers:   Lab Results   Component Value Date/Time     10/30/2015 01:30 PM    CK-MB Index 0.5 10/30/2015 01:30 PM       Assessment:  Chronic migraine, some compliance issues. Sounds like the Topamax was working, but now with significant worsening of her headache and left sided weakness. Has a normal CT scan. I'm concerned she is embellishing, but there's definitive left sided weakness. Plan: Will get a STAT MRI of the brain. If negative, this is likely to be factitious. If positive, may need admission for workup. Will also start low dose Pamelor to try to help with sleep and the pain. Return here next week. Sincerely,        Kumar Johnson.  Bárbara Che M.D.

## 2017-02-24 NOTE — MR AVS SNAPSHOT
Visit Information Date & Time Provider Department Dept. Phone Encounter #  
 2/24/2017  2:20 PM Arya Ro, [address] [phone] [id number] Follow-up Instructions Return in about 1 week (around 3/3/2017). Follow-up and Disposition History Your Appointments 3/3/2017  1:00 PM  
Follow Up with Arya Ro MD  
[address] Appt Note: 1wk f/u;  MRI  
 [address] [facility] [id number] [phone]  
  
   
 [place] [phone] 4/14/2017  2:30 PM  
[place] [address] [address]) Appt Note: Return in about 4 months (around 4/15/2017) for routine office visit [address] [facility] [address] [facility] [address]  
  
   
 [address] [facility] 1334  
  
    
 6/9/2017 11:00 AM  
ANNUAL with Jean Moeller DO  
[facility] ([facility]) Appt Note: annual  
 [facility] 83 [phone]  
[phone]  
  
   
 [facility] [phone] Upcoming Health Maintenance Date Due Pneumococcal 19-64 Medium Risk (1 of 1 - PPSV23) 8/26/1995 DTaP/Tdap/Td series (1 - Tdap) 11/17/2015 PAP AKA CERVICAL CYTOLOGY 7/1/2019 Allergies as of 2/24/2017  Review Complete On: 2/24/2017 By: Arya Ro MD  
  
 Severity Noted Reaction Type Reactions Relpax [Eletriptan Hbr] High 02/25/2013    Anaphylaxis Relpax [Eletriptan Hbr]  04/24/2014    Sneezing Ultram [Tramadol]  02/25/2013    Nausea Only Ultram [Tramadol]  04/24/2014    Nausea and Vomiting Current Immunizations  Reviewed on 10/12/2016 Name Date Td, Adsorbed PF 11/16/2015  1:25 PM  
  
 Not reviewed this visit You Were Diagnosed With   
  
 Codes Comments  Migraine without aura and without status migrainosus, not intractable -  Primary ICD-10-CM: R25.121 ICD-9-CM: 346.10 Cigarette smoker     ICD-10-CM: F17.210 ICD-9-CM: 305.1 Left-sided weakness     ICD-10-CM: M62.81 ICD-9-CM: 728.87 Vitals BP  
  
  
  
  
  
 152/80 BMI and BSA Data Body Mass Index Body Surface Area  
 28.89 kg/m 2 1.9 m 2 Preferred Pharmacy Pharmacy Name Phone [facility] [address], [facility] [phone] Your Updated Medication List  
  
   
This list is accurate as of: 2/24/17  2:50 PM.  Always use your most recent med list.  
  
  
  
  
 Theora Goodwill 250-50 mcg/dose diskus inhaler Generic drug:  fluticasone-salmeterol INHALE ONE DOSE BY MOUTH TWICE DAILY  
  
 albuterol 90 mcg/actuation inhaler Commonly known as:  PROVENTIL HFA, VENTOLIN HFA, PROAIR HFA Take 2 Puffs by inhalation every four (4) hours as needed for Wheezing. butalbital-acetaminophen-caffeine -40 mg per tablet Commonly known as:  Charlotte Melly Take 1 Tab by mouth every six (6) hours as needed for Pain or Headache. docusate sodium 100 mg capsule Commonly known as:  Vermell Nones Take 1 Cap by mouth two (2) times a day. KlonoPIN 0.5 mg tablet Generic drug:  clonazePAM  
Take  by mouth nightly as needed. methocarbamol 500 mg tablet Commonly known as:  ROBAXIN Take 1 Tab by mouth four (4) times daily as needed. nortriptyline 25 mg capsule Commonly known as:  PAMELOR Take 1 Cap by mouth nightly. May increase to 2 pills in 3 days  
  
 pantoprazole 40 mg tablet Commonly known as:  PROTONIX Take 1 Tab by mouth daily. simvastatin 20 mg tablet Commonly known as:  ZOCOR Take 1 Tab by mouth nightly. topiramate 200 mg tablet Commonly known as:  TOPAMAX TAKE ONE TABLET BY MOUTH TWICE DAILY  Indications: MIGRAINE PREVENTION  
  
 venlafaxine- mg capsule Commonly known as:  EFFEXOR-XR Take 150 mg by mouth daily. Prescriptions Sent to Pharmacy Refills  
 nortriptyline (PAMELOR) 25 mg capsule 5 Sig: Take 1 Cap by mouth nightly. May increase to 2 pills in 3 days Class: Normal  
 Pharmacy: [address], [address]  #: [phone] Route: Oral  
  
Follow-up Instructions Return in about 1 week (around 3/3/2017). To-Do List   
 02/24/2017 Imaging:  MRI BRAIN WO CONT Introducing \[facility]\"" & HEALTH SERVICES! Harpal Nelson introduces Connect[employer] patient portal. Now you can access parts of your medical record, email your doctor's office, and request medication refills online. 1. In your internet browser, go to [email or web address] 2. Click on the First Time User? Click Here link in the Sign In box. You will see the New Member Sign Up page. 3. Enter your Connect[employer] Access Code exactly as it appears below. You will not need to use this code after youve completed the sign-up process. If you do not sign up before the expiration date, you must request a new code. · [employer] Access Code: [email or web address] Expires: 5/8/2017 12:58 PM 
 
4. Enter the last four digits of your Social Security Number (xxxx) and Date of Birth (mm/dd/yyyy) as indicated and click Submit. You will be taken to the next sign-up page. 5. Create a ConnectSoft ID. This will be your Connect[employer] login ID and cannot be changed, so think of one that is secure and easy to remember. 6. Create a [employer] password. You can change your password at any time. 7. Enter your Password Reset Question and Answer. This can be used at a later time if you forget your password. 8. Enter your e-mail address. You will receive e-mail notification when new information is available in [address] 9. Click Sign Up. You can now view and download portions of your medical record. 10. Click the Download Summary menu link to download a portable copy of your medical information. If you have questions, please visit the Frequently Asked Questions section of the [employer] website. Remember, ethority is NOT to be used for urgent needs. For medical emergencies, dial 911. Now available from your iPhone and Android! Please provide this summary of care documentation to your next provider. Your primary care clinician is listed as Ginette Barnes. If you have any questions after today's visit, please call [phone].

## 2017-02-24 NOTE — COMMUNICATION BODY
Re:  Kiana Cast up visit     2/24/2017 2:29 PM        SSN: [id number]    Subjective:   Urmila Gunter returns for follow up of her migraines. She started to have a headache on February 4. She sates prior to that the pain was less intense. She's recently started to get woken up in the middle of the night with pain. This is new in the last several weeks. The pain is mostly right sided. She went to [facility] because of weakness of the left side with some sensory changes. Medications:    Current Outpatient Prescriptions   Medication Sig Dispense Refill    ADVAIR DISKUS 250-50 mcg/dose diskus inhaler INHALE ONE DOSE BY MOUTH TWICE DAILY 1 Inhaler 3    butalbital-acetaminophen-caffeine (FIORICET, ESGIC) -40 mg per tablet Take 1 Tab by mouth every six (6) hours as needed for Pain or Headache. 20 Tab 0    simvastatin (ZOCOR) 20 mg tablet Take 1 Tab by mouth nightly. 30 Tab 2    topiramate (TOPAMAX) 200 mg tablet TAKE ONE TABLET BY MOUTH TWICE DAILY  Indications: MIGRAINE PREVENTION 60 Tab 6    clonazePAM (KLONOPIN) 0.5 mg tablet Take  by mouth nightly as needed.  pantoprazole (PROTONIX) 40 mg tablet Take 1 Tab by mouth daily. 30 Tab 3    docusate sodium (COLACE) 100 mg capsule Take 1 Cap by mouth two (2) times a day. 60 Cap 0    venlafaxine-SR (EFFEXOR-XR) 150 mg capsule Take 150 mg by mouth daily.  albuterol (PROVENTIL HFA, VENTOLIN HFA, PROAIR HFA) 90 mcg/actuation inhaler Take 2 Puffs by inhalation every four (4) hours as needed for Wheezing. 1 Inhaler 2    methocarbamol (ROBAXIN) 500 mg tablet Take 1 Tab by mouth four (4) times daily as needed.  30 Tab 0       Vital signs:    Visit Vitals    /80    Pulse 76    Temp 98 °F (36.7 °C) (Oral)    Ht 5' 5\" (1.651 m)    Wt 78.7 kg (173 lb 9.6 oz)    LMP 09/28/2016    SpO2 98%    BMI 28.89 kg/m2       Review of Systems:   As above otherwise 11 point review of systems negative including; Constitutional no fever or chills  Skin denies rash or itching  HEENT  Denies tinnitus, hearing lose  Eyes denies diplopia vision lose  Respiratory denies sortness of breath  Cardiovascular denies chest pain, dyspnea on exertion  Gastrointestinal denies nausea, vomiting, diarrhea, constipation  Genitourinary denies incontinence  Musculoskeletal denies joint pain or swelling  Endocrine denies weight change  Hematology denies easy bruising or bleeding   Neurological as above in HPI      Patient Active Problem List   Diagnosis Code    Asthma J45.909    Migraine G43.909    Hypotension I95.9    Fibromyalgia M79.7    Bipolar depression ([facility] [address] F31.30    History of stroke Z86.73    Gastroesophageal reflux disease with esophagitis K21.0    Hyperlipidemia E78.5    Chronic constipation K59.09    Anemia D64.9    Chronic obstructive pulmonary disease with acute exacerbation (HCC) J44.1    Cigarette smoker F17.210         Objective: The patient is awake, alert, and oriented x 4. Fund of knowledge is adequate. Speech is fluent and memory is intact. Cranial Nerves: II - Visual fields are full to confrontation. III, IV, VI - Extraocular movements are intact. There is no nystagmus. V - Facial sensation is intact to pinprick. VII - Face is asymmetrical, she has decreased movement of the left face, but at rest the face is symmetrical.  VIII - Hearing is present. IX, X, XII - Palate is symmetrical.   XI - Shoulder shrugging and head turning intact  Motor: The patient has 2/5 strength throughout the left side . Tone is normal. Reflexes are 2+ and symmetrical. Plantars are down going. Gait is abnormal, she limps off of the left leg, but does not fall.     CBC:   Lab Results   Component Value Date/Time    WBC 9.4 10/24/2016 03:00 PM    RBC 4.49 10/24/2016 03:00 PM    HGB 12.3 10/24/2016 03:00 PM    HCT 39.0 10/24/2016 03:00 PM    PLATELET 552 91/68/4029 03:00 PM     BMP:   Lab Results   Component Value Date/Time Glucose 79 01/05/2017 12:02 PM    Sodium 143 01/05/2017 12:02 PM    Potassium 4.3 01/05/2017 12:02 PM    Chloride 111 01/05/2017 12:02 PM    CO2 24 01/05/2017 12:02 PM    BUN 8 01/05/2017 12:02 PM    Creatinine 0.89 01/05/2017 12:02 PM    Calcium 9.0 01/05/2017 12:02 PM     CMP:   Lab Results   Component Value Date/Time    Glucose 79 01/05/2017 12:02 PM    Sodium 143 01/05/2017 12:02 PM    Potassium 4.3 01/05/2017 12:02 PM    Chloride 111 01/05/2017 12:02 PM    CO2 24 01/05/2017 12:02 PM    BUN 8 01/05/2017 12:02 PM    Creatinine 0.89 01/05/2017 12:02 PM    Calcium 9.0 01/05/2017 12:02 PM    Anion gap 8 01/05/2017 12:02 PM    BUN/Creatinine ratio 9 01/05/2017 12:02 PM    Alk. phosphatase 86 09/23/2016 12:18 PM    Protein, total 7.1 09/23/2016 12:18 PM    Albumin 3.6 09/23/2016 12:18 PM    Globulin 3.5 09/23/2016 12:18 PM    A-G Ratio 1.0 09/23/2016 12:18 PM     Coagulation:   Lab Results   Component Value Date/Time    Prothrombin time 13.4 09/23/2016 12:18 PM    INR 1.1 09/23/2016 12:18 PM     Cardiac markers:   Lab Results   Component Value Date/Time     10/30/2015 01:30 PM    CK-MB Index 0.5 10/30/2015 01:30 PM       Assessment:  Chronic migraine, some compliance issues. Sounds like the Topamax was working, but now with significant worsening of her headache and left sided weakness. Has a normal CT scan. I'm concerned she is embellishing, but there's definitive left sided weakness. Plan: Will get a STAT MRI of the brain. If negative, this is likely to be factitious. If positive, may need admission for workup. Will also start low dose Pamelor to try to help with sleep and the pain. Return here next week. Sincerely,        Shena Caceres.  Saige Martinez M.D.

## 2017-03-02 ENCOUNTER — HOSPITAL ENCOUNTER (OUTPATIENT)
Dept: MRI IMAGING | Age: 42
Discharge: HOME | End: 2017-03-02
Attending: PSYCHIATRY & NEUROLOGY
Payer: MEDICAID

## 2017-03-02 DIAGNOSIS — G43.009 MIGRAINE WITHOUT AURA AND WITHOUT STATUS MIGRAINOSUS, NOT INTRACTABLE: ICD-10-CM

## 2017-03-02 DIAGNOSIS — R53.1 LEFT-SIDED WEAKNESS: ICD-10-CM

## 2017-03-02 PROCEDURE — 70551 MRI BRAIN STEM W/O DYE: CPT

## 2017-03-03 ENCOUNTER — OFFICE VISIT (OUTPATIENT)
Dept: NEUROLOGY | Age: 42
End: 2017-03-03

## 2017-03-03 VITALS
TEMPERATURE: 98.5 F | SYSTOLIC BLOOD PRESSURE: 128 MMHG | HEIGHT: 65 IN | BODY MASS INDEX: 28.82 KG/M2 | DIASTOLIC BLOOD PRESSURE: 76 MMHG | OXYGEN SATURATION: 98 % | HEART RATE: 84 BPM | WEIGHT: 173 LBS | RESPIRATION RATE: 14 BRPM

## 2017-03-03 DIAGNOSIS — G43.009 MIGRAINE WITHOUT AURA AND WITHOUT STATUS MIGRAINOSUS, NOT INTRACTABLE: ICD-10-CM

## 2017-03-03 RX ORDER — METHYLPREDNISOLONE 4 MG/1
TABLET ORAL
Qty: 1 DOSE PACK | Refills: 0 | Status: SHIPPED | OUTPATIENT
Start: 2017-03-03 | End: 2017-03-17 | Stop reason: ALTCHOICE

## 2017-03-03 RX ORDER — NORTRIPTYLINE HYDROCHLORIDE 75 MG/1
75 CAPSULE ORAL
Qty: 30 CAP | Refills: 6 | Status: SHIPPED | OUTPATIENT
Start: 2017-03-03 | End: 2017-06-23 | Stop reason: SDUPTHER

## 2017-03-03 NOTE — LETTER
3/3/2017 1:31 PM 
 
Patient:  Kym Garcia YOB: 1975 Date of Visit: 3/3/2017 Dear Les Carmona, VENICE 
[address] [address] VIA In Basket 
 : Thank you for referring Ms. Ramana Holcomb to me for evaluation/treatment. Below are the relevant portions of my assessment and plan of care. Re:  Chuckie Crowder up visit     3/3/2017 1:22 PM 
 
 
 
 
SSN: [id number] Subjective:   Kym Gracia returns for follow up of her migraines. The headaches seem to be better since starting the Pamelor. She seems to be sleeping better. Medications:   
Current Outpatient Prescriptions Medication Sig Dispense Refill  nortriptyline (PAMELOR) 25 mg capsule Take 1 Cap by mouth nightly. May increase to 2 pills in 3 days 60 Cap 5  ADVAIR DISKUS 250-50 mcg/dose diskus inhaler INHALE ONE DOSE BY MOUTH TWICE DAILY 1 Inhaler 3  
 methocarbamol (ROBAXIN) 500 mg tablet Take 1 Tab by mouth four (4) times daily as needed. 30 Tab 0  
 butalbital-acetaminophen-caffeine (FIORICET, ESGIC) -40 mg per tablet Take 1 Tab by mouth every six (6) hours as needed for Pain or Headache. 20 Tab 0  
 simvastatin (ZOCOR) 20 mg tablet Take 1 Tab by mouth nightly. 30 Tab 2  
 topiramate (TOPAMAX) 200 mg tablet TAKE ONE TABLET BY MOUTH TWICE DAILY  Indications: MIGRAINE PREVENTION 60 Tab 6  clonazePAM (KLONOPIN) 0.5 mg tablet Take  by mouth nightly as needed.  pantoprazole (PROTONIX) 40 mg tablet Take 1 Tab by mouth daily. 30 Tab 3  
 docusate sodium (COLACE) 100 mg capsule Take 1 Cap by mouth two (2) times a day. 60 Cap 0  
 venlafaxine-SR (EFFEXOR-XR) 150 mg capsule Take 150 mg by mouth daily.  albuterol (PROVENTIL HFA, VENTOLIN HFA, PROAIR HFA) 90 mcg/actuation inhaler Take 2 Puffs by inhalation every four (4) hours as needed for Wheezing. 1 Inhaler 2 Vital signs:   
Visit Vitals  /76 (BP 1 Location: Right arm, BP Patient Position: Sitting)  Pulse 84  Temp 98.5 °F (36.9 °C) (Oral)  Resp 14  
 Ht 5' 5\" (1.651 m)  Wt 78.5 kg (173 lb)  LMP 09/28/2016  SpO2 98%  BMI 28.79 kg/m2 Review of Systems: As above otherwise 11 point review of systems negative including;  
Constitutional no fever or chills Skin denies rash or itching HEENT  Denies tinnitus, hearing lose Eyes denies diplopia vision lose Respiratory denies sortness of breath Cardiovascular denies chest pain, dyspnea on exertion Gastrointestinal denies nausea, vomiting, diarrhea, constipation Genitourinary denies incontinence Musculoskeletal denies joint pain or swelling Endocrine denies weight change Hematology denies easy bruising or bleeding Neurological as above in HPI Patient Active Problem List  
Diagnosis Code  Asthma J45.909  Migraine G43.909  Hypotension I95.9  Fibromyalgia M79.7  Bipolar depression ([facility]) F31.30  
 History of stroke Z86.73  
 Gastroesophageal reflux disease with esophagitis K21.0  Hyperlipidemia E78.5  Chronic constipation K59.09  
 Anemia D64.9  Chronic obstructive pulmonary disease with acute exacerbation ([facility]) J44.1  Cigarette smoker F17.210  Left-sided weakness M62.81 Objective: The patient is awake, alert, and oriented x 4. Fund of knowledge is adequate. Speech is fluent and memory is intact. Cranial Nerves: II  Visual fields are full to confrontation. III, IV, VI  Extraocular movements are intact. There is no nystagmus. V  Facial sensation is intact to pinprick. VII  Face is asymmetrical, she has decreased movement of the left face, but at rest the face is symmetrical.  VIII - Hearing is present. IX, X, XII  Palate is symmetrical.   XI - Shoulder shrugging and head turning intact Motor:   The patient has 2/5 strength throughout the left side, but there's no pronator drift. Tone is normal. Reflexes are 2+ and symmetrical. Plantars are down going. Gait is abnormal, she limps off of the left leg, but does not fall. CBC:  
Lab Results Component Value Date/Time WBC 9.4 10/24/2016 03:00 PM  
 RBC 4.49 10/24/2016 03:00 PM  
 HGB 12.3 10/24/2016 03:00 PM  
 HCT 39.0 10/24/2016 03:00 PM  
 PLATELET 926 13/42/9519 03:00 PM  
 
BMP:  
Lab Results Component Value Date/Time Glucose 79 01/05/2017 12:02 PM  
 Sodium 143 01/05/2017 12:02 PM  
 Potassium 4.3 01/05/2017 12:02 PM  
 Chloride 111 01/05/2017 12:02 PM  
 CO2 24 01/05/2017 12:02 PM  
 BUN 8 01/05/2017 12:02 PM  
 Creatinine 0.89 01/05/2017 12:02 PM  
 Calcium 9.0 01/05/2017 12:02 PM  
 
CMP:  
Lab Results Component Value Date/Time Glucose 79 01/05/2017 12:02 PM  
 Sodium 143 01/05/2017 12:02 PM  
 Potassium 4.3 01/05/2017 12:02 PM  
 Chloride 111 01/05/2017 12:02 PM  
 CO2 24 01/05/2017 12:02 PM  
 BUN 8 01/05/2017 12:02 PM  
 Creatinine 0.89 01/05/2017 12:02 PM  
 Calcium 9.0 01/05/2017 12:02 PM  
 Anion gap 8 01/05/2017 12:02 PM  
 BUN/Creatinine ratio 9 01/05/2017 12:02 PM  
 Alk. phosphatase 86 09/23/2016 12:18 PM  
 Protein, total 7.1 09/23/2016 12:18 PM  
 Albumin 3.6 09/23/2016 12:18 PM  
 Globulin 3.5 09/23/2016 12:18 PM  
 A-G Ratio 1.0 09/23/2016 12:18 PM  
 
Coagulation:  
Lab Results Component Value Date/Time Prothrombin time 13.4 09/23/2016 12:18 PM  
 INR 1.1 09/23/2016 12:18 PM  
 
Cardiac markers:  
Lab Results Component Value Date/Time  10/30/2015 01:30 PM  
 CK-MB Index 0.5 10/30/2015 01:30 PM  
 
Final result (Exam End: 3/2/2017  6:23 PM) Open Study Result History: Migraine, left-sided weakness 
  
Comparison 10/3/2015 
  
PROCEDURE: 
  
Axial spin echo T1, FSE T2, FLAIR, T2 gradient and diffusion sequences, sagittal 
spin echo T1, and coronal spin echo T1 and T2 sequences of the brain were 
obtained without gadolinium. 
  
FINDINGS:  
  
 Diffusion: There are no areas of restricted diffusion, no evidence of an acute 
infarction. 
  
Brain parenchyma: There is no focal signal abnormality, mass or mass effect. 
  
Ventricles and sulci: Normal. No significant atrophy given age. 
  
Extra-axial: No extra-axial fluid collection or mass is noted. 
  
Brain vasculature: No vascular abnormality is appreciated on this routine brain MR examination. 
  
Craniocervical junction: Normal. 
  
Skull base, extracranial and calvarium: Near complete resolution previous 
paranasal sinus mucosal thickening and small fluid levels with minimal residual 
retention cyst ventral right sphenoid sinus. Orbits IACs and mastoids, sella and 
skull unremarkable. 
  
IMPRESSION Impression: 
  
  
  
1. Normal MRI brain. 
  
2. Near complete resolution previous sinusitis with small residual retention 
cyst sphenoid sinus. Assessment:  Chronic migraine, some compliance issues. Sounds like the Topamax was working, but now with significant worsening of her headache and left sided weakness. Has a normal CT and MRI scan. I'm concerned she is embellishing, especially given the normal MRI scan, but there's definitive left sided weakness. Plan:   MRI negative, this is likely to be factitious. Will increase the Pamelor to 75 mg nightly. Will also add a medrol dosepak for the pain. Return here in 3 weeks. Sincerely, 
 
 
 
Kayce Aguirre. Janet Jacob M.D. If you have questions, please do not hesitate to call me. I look forward to following MsCarly Avtar Bai along with you.  
 
 
 
Sincerely, 
 
 
Vale Emmanuel MD

## 2017-03-03 NOTE — MR AVS SNAPSHOT
Visit Information Date & Time Provider Department Dept. Phone Encounter #  
 3/3/2017  1:00 PM Lisa Jauregui 97 [phone] [id number] Follow-up Instructions Return in about 6 weeks (around 4/14/2017). Follow-up and Disposition History Your Appointments 4/14/2017  1:20 PM  
Follow Up with MD Lisa Jauregui [address] [place] [address]) Appt Note: 6wk f/u  
 [address] [address]  
[phone]  
  
   
 [place] [phone] 4/14/2017  2:30 PM  
ROUTINE [place] [id number] [facility] [address] [place] [address]) Appt Note: Return in about 4 months (around 4/15/2017) for routine office visit [address] [facility] [address]  
  
   
 [address] [facility] 1334  
  
    
 6/9/2017 11:00 AM  
ANNUAL with Shahab Alberto DO  
[facility] ([address] [place] [address]) Appt Note: annual  
 [facility] 83 [phone]  
[phone]  
  
   
 [facility] 83 [phone] Upcoming Health Maintenance Date Due Pneumococcal 19-64 Medium Risk (1 of 1 - PPSV23) 8/26/1995 DTaP/Tdap/Td series (1 - Tdap) 11/17/2015 PAP AKA CERVICAL CYTOLOGY 7/1/2019 Allergies as of 3/3/2017  Review Complete On: 3/3/2017 By: Carmelo Ortiz LPN Severity Noted Reaction Type Reactions Relpax [Eletriptan Hbr] High 02/25/2013    Anaphylaxis Relpax [Eletriptan Hbr]  04/24/2014    Sneezing Ultram [Tramadol]  02/25/2013    Nausea Only Ultram [Tramadol]  04/24/2014    Nausea and Vomiting Current Immunizations  Reviewed on 10/12/2016 Name Date Td, Adsorbed PF 11/16/2015  1:25 PM  
  
 Not reviewed this visit You Were Diagnosed With   
  
 Codes Comments  Migraine without aura and without status migrainosus, not intractable ICD-10-CM: R41.749 ICD-9-CM: 346.10 Vitals BP  
  
  
  
  
  
 128/76 (BP 1 Location: Right arm, BP Patient Position: Sitting) Vitals History BMI and BSA Data Body Mass Index Body Surface Area 28.79 kg/m 2 1.9 m 2 Preferred Pharmacy Pharmacy Name Phone [facility] [address], [place] [phone] Your Updated Medication List  
  
   
This list is accurate as of: 3/3/17  1:35 PM.  Always use your most recent med list.  
  
  
  
  
 Rakel Littler 250-50 mcg/dose diskus inhaler Generic drug:  fluticasone-salmeterol INHALE ONE DOSE BY MOUTH TWICE DAILY  
  
 albuterol 90 mcg/actuation inhaler Commonly known as:  PROVENTIL HFA, VENTOLIN HFA, PROAIR HFA Take 2 Puffs by inhalation every four (4) hours as needed for Wheezing. butalbital-acetaminophen-caffeine -40 mg per tablet Commonly known as:  Marthenia Loots Take 1 Tab by mouth every six (6) hours as needed for Pain or Headache. docusate sodium 100 mg capsule Commonly known as:  Arman Duster Take 1 Cap by mouth two (2) times a day. KlonoPIN 0.5 mg tablet Generic drug:  clonazePAM  
Take  by mouth nightly as needed. methocarbamol 500 mg tablet Commonly known as:  ROBAXIN Take 1 Tab by mouth four (4) times daily as needed. methylPREDNISolone 4 mg tablet Commonly known as:  Louie Rubins As directed  
  
 nortriptyline 75 mg capsule Commonly known as:  PAMELOR Take 1 Cap by mouth nightly. pantoprazole 40 mg tablet Commonly known as:  PROTONIX Take 1 Tab by mouth daily. simvastatin 20 mg tablet Commonly known as:  ZOCOR Take 1 Tab by mouth nightly. topiramate 200 mg tablet Commonly known as:  TOPAMAX TAKE ONE TABLET BY MOUTH TWICE DAILY  Indications: MIGRAINE PREVENTION  
  
 venlafaxine- mg capsule Commonly known as:  EFFEXOR-XR Take 150 mg by mouth daily. Prescriptions Sent to Pharmacy Refills  
 nortriptyline (PAMELOR) 75 mg capsule 6 Sig: Take 1 Cap by mouth nightly. Class: Normal  
 Pharmacy: [facility] [address], [address] Ph #: [phone] Route: Oral  
 methylPREDNISolone (MEDROL DOSEPACK) 4 mg tablet 0 Sig: As directed Class: Normal  
 Pharmacy: [facility] [address], [address] Ph #: [phone] Follow-up Instructions Return in about 6 weeks (around 4/14/2017). Introducing [facility]! Alan New introduces [employer] patient portal. Now you can access parts of your medical record, email your doctor's office, and request medication refills online. 1. In your internet browser, go to [email or web address] 2. Click on the First Time User? Click Here link in the Sign In box. You will see the New Member Sign Up page. 3. Enter your [employer] Access Code exactly as it appears below. You will not need to use this code after youve completed the sign-up process. If you do not sign up before the expiration date, you must request a new code. · [employer] Access Code: [email or web address] Expires: 5/8/2017 12:58 PM 
 
4. Enter the last four digits of your Social Security Number (xxxx) and Date of Birth (mm/dd/yyyy) as indicated and click Submit. You will be taken to the next sign-up page. 5. Create a [employer] ID. This will be your [employer] login ID and cannot be changed, so think of one that is secure and easy to remember. 6. Create a [employer] password. You can change your password at any time. 7. Enter your Password Reset Question and Answer. This can be used at a later time if you forget your password. 8. Enter your e-mail address. You will receive e-mail notification when new information is available in [address] 9. Click Sign Up. You can now view and download portions of your medical record. 10. Click the Download Summary menu link to download a portable copy of your medical information. If you have questions, please visit the Frequently Asked Questions section of the [employer] website. Remember, [employer] is NOT to be used for urgent needs. For medical emergencies, dial 911. Now available from your iPhone and Android! Please provide this summary of care documentation to your next provider. Your primary care clinician is listed as Yuan Carter. If you have any questions after today's visit, please call [phone].

## 2017-03-03 NOTE — PROGRESS NOTES
Re:  Bladimir Alta up visit     3/3/2017 1:22 PM          SSN: [id number]    Subjective:   Giuliana Alvares returns for follow up of her migraines. The headaches seem to be better since starting the Pamelor. She seems to be sleeping better. Medications:    Current Outpatient Prescriptions   Medication Sig Dispense Refill    nortriptyline (PAMELOR) 25 mg capsule Take 1 Cap by mouth nightly. May increase to 2 pills in 3 days 60 Cap 5    ADVAIR DISKUS 250-50 mcg/dose diskus inhaler INHALE ONE DOSE BY MOUTH TWICE DAILY 1 Inhaler 3    methocarbamol (ROBAXIN) 500 mg tablet Take 1 Tab by mouth four (4) times daily as needed. 30 Tab 0    butalbital-acetaminophen-caffeine (FIORICET, ESGIC) -40 mg per tablet Take 1 Tab by mouth every six (6) hours as needed for Pain or Headache. 20 Tab 0    simvastatin (ZOCOR) 20 mg tablet Take 1 Tab by mouth nightly. 30 Tab 2    topiramate (TOPAMAX) 200 mg tablet TAKE ONE TABLET BY MOUTH TWICE DAILY  Indications: MIGRAINE PREVENTION 60 Tab 6    clonazePAM (KLONOPIN) 0.5 mg tablet Take  by mouth nightly as needed.  pantoprazole (PROTONIX) 40 mg tablet Take 1 Tab by mouth daily. 30 Tab 3    docusate sodium (COLACE) 100 mg capsule Take 1 Cap by mouth two (2) times a day. 60 Cap 0    venlafaxine-SR (EFFEXOR-XR) 150 mg capsule Take 150 mg by mouth daily.  albuterol (PROVENTIL HFA, VENTOLIN HFA, PROAIR HFA) 90 mcg/actuation inhaler Take 2 Puffs by inhalation every four (4) hours as needed for Wheezing.  1 Inhaler 2       Vital signs:    Visit Vitals    /76 (BP 1 Location: Right arm, BP Patient Position: Sitting)    Pulse 84    Temp 98.5 °F (36.9 °C) (Oral)    Resp 14    Ht 5' 5\" (1.651 m)    Wt 78.5 kg (173 lb)    LMP 09/28/2016    SpO2 98%    BMI 28.79 kg/m2       Review of Systems:   As above otherwise 11 point review of systems negative including;   Constitutional no fever or chills  Skin denies rash or itching  HEENT Denies tinnitus, hearing lose  Eyes denies diplopia vision lose  Respiratory denies sortness of breath  Cardiovascular denies chest pain, dyspnea on exertion  Gastrointestinal denies nausea, vomiting, diarrhea, constipation  Genitourinary denies incontinence  Musculoskeletal denies joint pain or swelling  Endocrine denies weight change  Hematology denies easy bruising or bleeding   Neurological as above in HPI      Patient Active Problem List   Diagnosis Code    Asthma J45.909    Migraine G43.909    Hypotension I95.9    Fibromyalgia M79.7    Bipolar depression ([facility] [address] F31.30    History of stroke Z86.73    Gastroesophageal reflux disease with esophagitis K21.0    Hyperlipidemia E78.5    Chronic constipation K59.09    Anemia D64.9    Chronic obstructive pulmonary disease with acute exacerbation ([facility]) J44.1    Cigarette smoker F17.210    Left-sided weakness M62.81         Objective: The patient is awake, alert, and oriented x 4. Fund of knowledge is adequate. Speech is fluent and memory is intact. Cranial Nerves: II - Visual fields are full to confrontation. III, IV, VI - Extraocular movements are intact. There is no nystagmus. V - Facial sensation is intact to pinprick. VII - Face is asymmetrical, she has decreased movement of the left face, but at rest the face is symmetrical.  VIII - Hearing is present. IX, X, XII - Palate is symmetrical.   XI - Shoulder shrugging and head turning intact  Motor: The patient has 2/5 strength throughout the left side, but there's no pronator drift. Tone is normal. Reflexes are 2+ and symmetrical. Plantars are down going. Gait is abnormal, she limps off of the left leg, but does not fall.     CBC:   Lab Results   Component Value Date/Time    WBC 9.4 10/24/2016 03:00 PM    RBC 4.49 10/24/2016 03:00 PM    HGB 12.3 10/24/2016 03:00 PM    HCT 39.0 10/24/2016 03:00 PM    PLATELET 748 63/55/3985 03:00 PM     BMP:   Lab Results   Component Value Date/Time    Glucose 79 01/05/2017 12:02 PM    Sodium 143 01/05/2017 12:02 PM    Potassium 4.3 01/05/2017 12:02 PM    Chloride 111 01/05/2017 12:02 PM    CO2 24 01/05/2017 12:02 PM    BUN 8 01/05/2017 12:02 PM    Creatinine 0.89 01/05/2017 12:02 PM    Calcium 9.0 01/05/2017 12:02 PM     CMP:   Lab Results   Component Value Date/Time    Glucose 79 01/05/2017 12:02 PM    Sodium 143 01/05/2017 12:02 PM    Potassium 4.3 01/05/2017 12:02 PM    Chloride 111 01/05/2017 12:02 PM    CO2 24 01/05/2017 12:02 PM    BUN 8 01/05/2017 12:02 PM    Creatinine 0.89 01/05/2017 12:02 PM    Calcium 9.0 01/05/2017 12:02 PM    Anion gap 8 01/05/2017 12:02 PM    BUN/Creatinine ratio 9 01/05/2017 12:02 PM    Alk. phosphatase 86 09/23/2016 12:18 PM    Protein, total 7.1 09/23/2016 12:18 PM    Albumin 3.6 09/23/2016 12:18 PM    Globulin 3.5 09/23/2016 12:18 PM    A-G Ratio 1.0 09/23/2016 12:18 PM     Coagulation:   Lab Results   Component Value Date/Time    Prothrombin time 13.4 09/23/2016 12:18 PM    INR 1.1 09/23/2016 12:18 PM     Cardiac markers:   Lab Results   Component Value Date/Time     10/30/2015 01:30 PM    CK-MB Index 0.5 10/30/2015 01:30 PM     Final result (Exam End: 3/2/2017  6:23 PM) Open    Study Result   History: Migraine, left-sided weakness     Comparison 10/3/2015     PROCEDURE:     Axial spin echo T1, FSE T2, FLAIR, T2 gradient and diffusion sequences, sagittal  spin echo T1, and coronal spin echo T1 and T2 sequences of the brain were  obtained without gadolinium.     FINDINGS:      Diffusion: There are no areas of restricted diffusion, no evidence of an acute  infarction.     Brain parenchyma:  There is no focal signal abnormality, mass or mass effect.     Ventricles and sulci: Normal. No significant atrophy given age.     Extra-axial: No extra-axial fluid collection or mass is noted.     Brain vasculature: No vascular abnormality is appreciated on this routine brain  MR examination.     Craniocervical junction: Normal.     Skull base, extracranial and calvarium: Near complete resolution previous  paranasal sinus mucosal thickening and small fluid levels with minimal residual  retention cyst ventral right sphenoid sinus. Orbits IACs and mastoids, sella and  skull unremarkable.     IMPRESSION  Impression:           1. Normal MRI brain.     2. Near complete resolution previous sinusitis with small residual retention  cyst sphenoid sinus. Assessment:  Chronic migraine, some compliance issues. Sounds like the Topamax was working, but now with significant worsening of her headache and left sided weakness. Has a normal CT and MRI scan. I'm concerned she is embellishing, especially given the normal MRI scan, but there's definitive left sided weakness. Plan:   MRI negative, this is likely to be factitious. Will increase the Pamelor to 75 mg nightly. Will also add a medrol dosepak for the pain. Return here in 3 weeks. Sincerely,        Marisol Lang.  Sammy Nguyen M.D.

## 2017-03-03 NOTE — COMMUNICATION BODY
Re:  Tenzin Betts up visit     3/3/2017 1:22 PM          SSN: [id number]    Subjective:   Guy Murillo returns for follow up of her migraines. The headaches seem to be better since starting the Pamelor. She seems to be sleeping better. Medications:    Current Outpatient Prescriptions   Medication Sig Dispense Refill    nortriptyline (PAMELOR) 25 mg capsule Take 1 Cap by mouth nightly. May increase to 2 pills in 3 days 60 Cap 5    ADVAIR DISKUS 250-50 mcg/dose diskus inhaler INHALE ONE DOSE BY MOUTH TWICE DAILY 1 Inhaler 3    methocarbamol (ROBAXIN) 500 mg tablet Take 1 Tab by mouth four (4) times daily as needed. 30 Tab 0    butalbital-acetaminophen-caffeine (FIORICET, ESGIC) -40 mg per tablet Take 1 Tab by mouth every six (6) hours as needed for Pain or Headache. 20 Tab 0    simvastatin (ZOCOR) 20 mg tablet Take 1 Tab by mouth nightly. 30 Tab 2    topiramate (TOPAMAX) 200 mg tablet TAKE ONE TABLET BY MOUTH TWICE DAILY  Indications: MIGRAINE PREVENTION 60 Tab 6    clonazePAM (KLONOPIN) 0.5 mg tablet Take  by mouth nightly as needed.  pantoprazole (PROTONIX) 40 mg tablet Take 1 Tab by mouth daily. 30 Tab 3    docusate sodium (COLACE) 100 mg capsule Take 1 Cap by mouth two (2) times a day. 60 Cap 0    venlafaxine-SR (EFFEXOR-XR) 150 mg capsule Take 150 mg by mouth daily.  albuterol (PROVENTIL HFA, VENTOLIN HFA, PROAIR HFA) 90 mcg/actuation inhaler Take 2 Puffs by inhalation every four (4) hours as needed for Wheezing.  1 Inhaler 2       Vital signs:    Visit Vitals    /76 (BP 1 Location: Right arm, BP Patient Position: Sitting)    Pulse 84    Temp 98.5 °F (36.9 °C) (Oral)    Resp 14    Ht 5' 5\" (1.651 m)    Wt 78.5 kg (173 lb)    LMP 09/28/2016    SpO2 98%    BMI 28.79 kg/m2       Review of Systems:   As above otherwise 11 point review of systems negative including;   Constitutional no fever or chills  Skin denies rash or itching  HEENT Denies tinnitus, hearing lose  Eyes denies diplopia vision lose  Respiratory denies sortness of breath  Cardiovascular denies chest pain, dyspnea on exertion  Gastrointestinal denies nausea, vomiting, diarrhea, constipation  Genitourinary denies incontinence  Musculoskeletal denies joint pain or swelling  Endocrine denies weight change  Hematology denies easy bruising or bleeding   Neurological as above in HPI      Patient Active Problem List   Diagnosis Code    Asthma J45.909    Migraine G43.909    Hypotension I95.9    Fibromyalgia M79.7    Bipolar depression ([facility] [address] F31.30    History of stroke Z86.73    Gastroesophageal reflux disease with esophagitis K21.0    Hyperlipidemia E78.5    Chronic constipation K59.09    Anemia D64.9    Chronic obstructive pulmonary disease with acute exacerbation ([facility]) J44.1    Cigarette smoker F17.210    Left-sided weakness M62.81         Objective: The patient is awake, alert, and oriented x 4. Fund of knowledge is adequate. Speech is fluent and memory is intact. Cranial Nerves: II - Visual fields are full to confrontation. III, IV, VI - Extraocular movements are intact. There is no nystagmus. V - Facial sensation is intact to pinprick. VII - Face is asymmetrical, she has decreased movement of the left face, but at rest the face is symmetrical.  VIII - Hearing is present. IX, X, XII - Palate is symmetrical.   XI - Shoulder shrugging and head turning intact  Motor: The patient has 2/5 strength throughout the left side, but there's no pronator drift. Tone is normal. Reflexes are 2+ and symmetrical. Plantars are down going. Gait is abnormal, she limps off of the left leg, but does not fall.     CBC:   Lab Results   Component Value Date/Time    WBC 9.4 10/24/2016 03:00 PM    RBC 4.49 10/24/2016 03:00 PM    HGB 12.3 10/24/2016 03:00 PM    HCT 39.0 10/24/2016 03:00 PM    PLATELET 898 99/36/9581 03:00 PM     BMP:   Lab Results   Component Value Date/Time    Glucose 79 01/05/2017 12:02 PM    Sodium 143 01/05/2017 12:02 PM    Potassium 4.3 01/05/2017 12:02 PM    Chloride 111 01/05/2017 12:02 PM    CO2 24 01/05/2017 12:02 PM    BUN 8 01/05/2017 12:02 PM    Creatinine 0.89 01/05/2017 12:02 PM    Calcium 9.0 01/05/2017 12:02 PM     CMP:   Lab Results   Component Value Date/Time    Glucose 79 01/05/2017 12:02 PM    Sodium 143 01/05/2017 12:02 PM    Potassium 4.3 01/05/2017 12:02 PM    Chloride 111 01/05/2017 12:02 PM    CO2 24 01/05/2017 12:02 PM    BUN 8 01/05/2017 12:02 PM    Creatinine 0.89 01/05/2017 12:02 PM    Calcium 9.0 01/05/2017 12:02 PM    Anion gap 8 01/05/2017 12:02 PM    BUN/Creatinine ratio 9 01/05/2017 12:02 PM    Alk. phosphatase 86 09/23/2016 12:18 PM    Protein, total 7.1 09/23/2016 12:18 PM    Albumin 3.6 09/23/2016 12:18 PM    Globulin 3.5 09/23/2016 12:18 PM    A-G Ratio 1.0 09/23/2016 12:18 PM     Coagulation:   Lab Results   Component Value Date/Time    Prothrombin time 13.4 09/23/2016 12:18 PM    INR 1.1 09/23/2016 12:18 PM     Cardiac markers:   Lab Results   Component Value Date/Time     10/30/2015 01:30 PM    CK-MB Index 0.5 10/30/2015 01:30 PM     Final result (Exam End: 3/2/2017  6:23 PM) Open    Study Result   History: Migraine, left-sided weakness     Comparison 10/3/2015     PROCEDURE:     Axial spin echo T1, FSE T2, FLAIR, T2 gradient and diffusion sequences, sagittal  spin echo T1, and coronal spin echo T1 and T2 sequences of the brain were  obtained without gadolinium.     FINDINGS:      Diffusion: There are no areas of restricted diffusion, no evidence of an acute  infarction.     Brain parenchyma:  There is no focal signal abnormality, mass or mass effect.     Ventricles and sulci: Normal. No significant atrophy given age.     Extra-axial: No extra-axial fluid collection or mass is noted.     Brain vasculature: No vascular abnormality is appreciated on this routine brain  MR examination.     Craniocervical junction: Normal.     Skull base, extracranial and calvarium: Near complete resolution previous  paranasal sinus mucosal thickening and small fluid levels with minimal residual  retention cyst ventral right sphenoid sinus. Orbits IACs and mastoids, sella and  skull unremarkable.     IMPRESSION  Impression:           1. Normal MRI brain.     2. Near complete resolution previous sinusitis with small residual retention  cyst sphenoid sinus. Assessment:  Chronic migraine, some compliance issues. Sounds like the Topamax was working, but now with significant worsening of her headache and left sided weakness. Has a normal CT and MRI scan. I'm concerned she is embellishing, especially given the normal MRI scan, but there's definitive left sided weakness. Plan:   MRI negative, this is likely to be factitious. Will increase the Pamelor to 75 mg nightly. Will also add a medrol dosepak for the pain. Return here in 3 weeks. Sincerely,        Carmel Dempsey.  Isrrael Baumann M.D.

## 2017-03-17 ENCOUNTER — OFFICE VISIT (OUTPATIENT)
Dept: FAMILY MEDICINE CLINIC | Age: 42
End: 2017-03-17

## 2017-03-17 VITALS
BODY MASS INDEX: 28.82 KG/M2 | SYSTOLIC BLOOD PRESSURE: 100 MMHG | TEMPERATURE: 97.9 F | OXYGEN SATURATION: 99 % | DIASTOLIC BLOOD PRESSURE: 64 MMHG | HEART RATE: 92 BPM | WEIGHT: 173 LBS | HEIGHT: 65 IN | RESPIRATION RATE: 17 BRPM

## 2017-03-17 DIAGNOSIS — G43.009 MIGRAINE WITHOUT AURA AND WITHOUT STATUS MIGRAINOSUS, NOT INTRACTABLE: ICD-10-CM

## 2017-03-17 DIAGNOSIS — R06.2 WHEEZING: ICD-10-CM

## 2017-03-17 DIAGNOSIS — H66.001 ACUTE SUPPURATIVE OTITIS MEDIA OF RIGHT EAR WITHOUT SPONTANEOUS RUPTURE OF TYMPANIC MEMBRANE, RECURRENCE NOT SPECIFIED: Primary | ICD-10-CM

## 2017-03-17 DIAGNOSIS — F17.210 CIGARETTE SMOKER: ICD-10-CM

## 2017-03-17 RX ORDER — AMOXICILLIN AND CLAVULANATE POTASSIUM 875; 125 MG/1; MG/1
1 TABLET, FILM COATED ORAL 2 TIMES DAILY
Qty: 20 TAB | Refills: 0 | Status: SHIPPED | OUTPATIENT
Start: 2017-03-17 | End: 2017-03-27

## 2017-03-17 RX ORDER — BUTALBITAL, ACETAMINOPHEN AND CAFFEINE 50; 325; 40 MG/1; MG/1; MG/1
1 TABLET ORAL
Qty: 10 TAB | Refills: 0 | Status: SHIPPED | OUTPATIENT
Start: 2017-03-17 | End: 2017-04-14 | Stop reason: SDUPTHER

## 2017-03-17 RX ORDER — FLUCONAZOLE 150 MG/1
150 TABLET ORAL DAILY
Qty: 1 TAB | Refills: 0 | Status: SHIPPED | OUTPATIENT
Start: 2017-03-17 | End: 2017-03-18
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
## 2017-03-17 NOTE — MR AVS SNAPSHOT
Visit Information Date & Time Provider Department Dept. Phone Encounter #  
 3/17/2017  3:00 PM Ashley Benjamin, VENICE [id number] [address] [phone] Follow-up Instructions Return if symptoms worsen or fail to improve. Your Appointments 3/17/2017  3:00 PM  
SAME [place] [address] [facility] [address]) Appt Note: Patient is requesting an appointment for earache and headaches. [address] [facility] [address]  
  
   
 [address] 4/14/2017  1:20 PM  
Follow Up with Linda Roberson MD  
[facility] [address] [place] [address]) Appt Note: 6wk f/u  
 [address] [address]  
[phone]  
  
   
 [place] [phone] 4/14/2017  2:30 PM  
ROUTINE CARE with Ashley Benjamin NP [address] [address]) Appt Note: Return in about 4 months (around 4/15/2017) for routine office visit [address] [facility] [address]  
  
   
 [address] [place] [address]  
  
    
 6/9/2017 11:00 AM  
ANNUAL with Alejandro Chapman [facility] ([address] [place] [address]) Appt Note: annual  
 [address] [facility] 83 [address]  
[phone]  
  
   
 [address] [facility] 83 [address] Upcoming Health Maintenance Date Due Pneumococcal 19-64 Medium Risk (1 of 1 - PPSV23) 8/26/1995 DTaP/Tdap/Td series (1 - Tdap) 11/17/2015 PAP AKA CERVICAL CYTOLOGY 7/1/2019 Allergies as of 3/17/2017  Review Complete On: 3/17/2017 By: Ashley Benjamin NP Severity Noted Reaction Type Reactions Relpax [Eletriptan Hbr] High 02/25/2013    Anaphylaxis Relpax [Eletriptan Hbr]  04/24/2014    Sneezing Ultram [Tramadol]  02/25/2013    Nausea Only Ultram [Tramadol]  04/24/2014    Nausea and Vomiting Current Immunizations  Reviewed on 10/12/2016 Name Date Td, Adsorbed PF 11/16/2015  1:25 PM  
  
 Not reviewed this visit You Were Diagnosed With   
  
 Codes Comments Acute suppurative otitis media of right ear without spontaneous rupture of tympanic membrane, recurrence not specified    -  Primary ICD-10-CM: H66.001 ICD-9-CM: 382.00 Migraine without aura and without status migrainosus, not intractable     ICD-10-CM: U46.875 ICD-9-CM: 346.10 Wheezing     ICD-10-CM: R06.2 ICD-9-CM: 786.07 Cigarette smoker     ICD-10-CM: F17.210 ICD-9-CM: 305.1 Vitals BP Pulse Temp Resp Height(growth percentile) Weight(growth percentile) 100/64 (BP 1 Location: Right arm, BP Patient Position: Sitting) 92 97.9 °F (36.6 °C) (Oral) 17 5' 5\" (1.651 m) 173 lb (78.5 kg) LMP SpO2 BMI OB Status Smoking Status 09/28/2016 99% 28.79 kg/m2 Hysterectomy Current Every Day Smoker BMI and BSA Data Body Mass Index Body Surface Area 28.79 kg/m 2 1.9 m 2 Preferred Pharmacy Pharmacy Name Phone [facility] [address], [address] [place] [address] [phone] Your Updated Medication List  
  
   
This list is accurate as of: 3/17/17  2:07 PM.  Always use your most recent med list.  
  
  
  
  
 Elizabeth Rocco 250-50 mcg/dose diskus inhaler Generic drug:  fluticasone-salmeterol INHALE ONE DOSE BY MOUTH TWICE DAILY  
  
 albuterol 90 mcg/actuation inhaler Commonly known as:  PROVENTIL HFA, VENTOLIN HFA, PROAIR HFA Take 2 Puffs by inhalation every four (4) hours as needed for Wheezing. amoxicillin-clavulanate 875-125 mg per tablet Commonly known as:  AUGMENTIN Take 1 Tab by mouth two (2) times a day for 10 days. butalbital-acetaminophen-caffeine -40 mg per tablet Commonly known as:  Gennett Madura Take 1 Tab by mouth every six (6) hours as needed for Pain or Headache. docusate sodium 100 mg capsule Commonly known as:  Rafael Pata Take 1 Cap by mouth two (2) times a day. fluconazole 150 mg tablet Commonly known as:  DIFLUCAN Take 1 Tab by mouth daily for 1 day. FDA advises cautious prescribing of oral fluconazole in pregnancy. KlonoPIN 0.5 mg tablet Generic drug:  clonazePAM  
Take  by mouth nightly as needed. methocarbamol 500 mg tablet Commonly known as:  ROBAXIN Take 1 Tab by mouth four (4) times daily as needed. nortriptyline 75 mg capsule Commonly known as:  PAMELOR Take 1 Cap by mouth nightly. pantoprazole 40 mg tablet Commonly known as:  PROTONIX Take 1 Tab by mouth daily. simvastatin 20 mg tablet Commonly known as:  ZOCOR Take 1 Tab by mouth nightly. topiramate 200 mg tablet Commonly known as:  TOPAMAX TAKE ONE TABLET BY MOUTH TWICE DAILY  Indications: MIGRAINE PREVENTION  
  
 venlafaxine- mg capsule Commonly known as:  EFFEXOR-XR Take 150 mg by mouth daily. Prescriptions Sent to Pharmacy Refills  
 amoxicillin-clavulanate (AUGMENTIN) 875-125 mg per tablet 0 Sig: Take 1 Tab by mouth two (2) times a day for 10 days. Class: Normal  
 Pharmacy: [facility] [address], [address] Ph #: [phone] Route: Oral  
 fluconazole (DIFLUCAN) 150 mg tablet 0 Sig: Take 1 Tab by mouth daily for 1 day. FDA advises cautious prescribing of oral fluconazole in pregnancy. Class: Normal  
 Pharmacy: [facility] [address], [address] Ph #: [phone] Route: Oral  
  
We Performed the Following [place] SMOKING AND TOBACCO USE CESSATION 3 - 10 MINUTES [54550 CPT(R)] Follow-up Instructions Return if symptoms worsen or fail to improve. Introducing [facility]!    
 Alok Alvarado introduces [email or web address] patient portal. Now you can access parts of your medical record, email your doctor's office, and request medication refills online. 1. In your internet browser, go to [email or web address] 2. Click on the First Time User? Click Here link in the Sign In box. You will see the New Member Sign Up page. 3. Enter your [employer] Access Code exactly as it appears below. You will not need to use this code after youve completed the sign-up process. If you do not sign up before the expiration date, you must request a new code. · [employer] Access Code: [email or web address] Expires: 5/8/2017  1:58 PM 
 
4. Enter the last four digits of your Social Security Number (xxxx) and Date of Birth (mm/dd/yyyy) as indicated and click Submit. You will be taken to the next sign-up page. 5. Create a [employer] ID. This will be your "[employer] login ID and cannot be changed, so think of one that is secure and easy to remember. 6. Create a [employer] password. You can change your password at any time. 7. Enter your Password Reset Question and Answer. This can be used at a later time if you forget your password. 8. Enter your e-mail address. You will receive e-mail notification when new information is available in [address] 9. Click Sign Up. You can now view and download portions of your medical record. 10. Click the Download Summary menu link to download a portable copy of your medical information. If you have questions, please visit the Frequently Asked Questions section of the "[employer] website. Remember, "[employer] is NOT to be used for urgent needs. For medical emergencies, dial 911. Now available from your iPhone and Android! Please provide this summary of care documentation to your next provider. Your primary care clinician is listed as Jeffrey Swift. If you have any questions after today's visit, please call [phone].

## 2017-03-17 NOTE — PROGRESS NOTES
Matthieu Mendoza is a 37 y.o. female presents today for migraines patient stated that it has been proven that migraine is related to her stroke smptoms and possibility of earache. Patient stated that her ear bled a few weeks ago but was unable to come to the doctor.

## 2017-04-14 ENCOUNTER — OFFICE VISIT (OUTPATIENT)
Dept: NEUROLOGY | Age: 42
End: 2017-04-14

## 2017-04-14 VITALS
DIASTOLIC BLOOD PRESSURE: 76 MMHG | TEMPERATURE: 98.8 F | BODY MASS INDEX: 28.46 KG/M2 | WEIGHT: 170.8 LBS | HEIGHT: 65 IN | OXYGEN SATURATION: 98 % | RESPIRATION RATE: 14 BRPM | SYSTOLIC BLOOD PRESSURE: 100 MMHG | HEART RATE: 81 BPM

## 2017-04-14 DIAGNOSIS — R06.2 WHEEZING: ICD-10-CM

## 2017-04-14 RX ORDER — BUTALBITAL, ACETAMINOPHEN AND CAFFEINE 50; 325; 40 MG/1; MG/1; MG/1
1 TABLET ORAL
Qty: 30 TAB | Refills: 0 | Status: SHIPPED | OUTPATIENT
Start: 2017-04-14

## 2017-04-14 RX ORDER — LAMOTRIGINE 25 MG/1
50 TABLET ORAL
COMMUNITY

## 2017-04-14 NOTE — COMMUNICATION BODY
Re:  Jaden Garcia up visit     4/14/2017 1:51 PM          SSN: [id number]    Subjective:   Fernanda Hsu returns for follow up of her migraines. The headaches seem to be better since starting the Pamelor. She seems to be sleeping better. She 's had less headaches over the last 6 weeks. She's having significant headaches 2 days per week and a amild headache 3 days per week. She feels as if she wishes to get back to school. Medications:    Current Outpatient Prescriptions   Medication Sig Dispense Refill    lamoTRIgine (LAMICTAL) 25 mg tablet Take 50 mg by mouth nightly.  butalbital-acetaminophen-caffeine (FIORICET, ESGIC) -40 mg per tablet Take 1 Tab by mouth every six (6) hours as needed for Pain or Headache. 10 Tab 0    nortriptyline (PAMELOR) 75 mg capsule Take 1 Cap by mouth nightly. 30 Cap 6    ADVAIR DISKUS 250-50 mcg/dose diskus inhaler INHALE ONE DOSE BY MOUTH TWICE DAILY 1 Inhaler 3    methocarbamol (ROBAXIN) 500 mg tablet Take 1 Tab by mouth four (4) times daily as needed. 30 Tab 0    simvastatin (ZOCOR) 20 mg tablet Take 1 Tab by mouth nightly. 30 Tab 2    topiramate (TOPAMAX) 200 mg tablet TAKE ONE TABLET BY MOUTH TWICE DAILY  Indications: MIGRAINE PREVENTION 60 Tab 6    pantoprazole (PROTONIX) 40 mg tablet Take 1 Tab by mouth daily. 30 Tab 3    docusate sodium (COLACE) 100 mg capsule Take 1 Cap by mouth two (2) times a day. 60 Cap 0    venlafaxine-SR (EFFEXOR-XR) 150 mg capsule Take 150 mg by mouth daily.  albuterol (PROVENTIL HFA, VENTOLIN HFA, PROAIR HFA) 90 mcg/actuation inhaler Take 2 Puffs by inhalation every four (4) hours as needed for Wheezing. 1 Inhaler 2    clonazePAM (KLONOPIN) 0.5 mg tablet Take  by mouth nightly as needed.          Vital signs:    Visit Vitals    /76 (BP 1 Location: Left arm, BP Patient Position: Sitting)    Pulse 81    Temp 98.8 °F (37.1 °C) (Oral)    Resp 14    Ht 5' 5\" (1.651 m)    Wt 77.5 kg (170 lb 12.8 oz)    LMP 09/28/2016    SpO2 98%    BMI 28.42 kg/m2       Review of Systems:   As above otherwise 11 point review of systems negative including;   Constitutional no fever or chills  Skin denies rash or itching  HEENT  Denies tinnitus, hearing lose  Eyes denies diplopia vision lose  Respiratory denies sortness of breath  Cardiovascular denies chest pain, dyspnea on exertion  Gastrointestinal denies nausea, vomiting, diarrhea, constipation  Genitourinary denies incontinence  Musculoskeletal denies joint pain or swelling  Endocrine denies weight change  Hematology denies easy bruising or bleeding   Neurological as above in HPI      Patient Active Problem List   Diagnosis Code    Asthma J45.909    Migraine G43.909    Hypotension I95.9    Fibromyalgia M79.7    Bipolar depression ([facility] F31.30    History of stroke Z86.73    Gastroesophageal reflux disease with esophagitis K21.0    Hyperlipidemia E78.5    Chronic constipation K59.09    Anemia D64.9    Chronic obstructive pulmonary disease with acute exacerbation (HCC) J44.1    Cigarette smoker F17.210    Left-sided weakness R53.1         Objective: The patient is awake, alert, and oriented x 4. Fund of knowledge is adequate. Speech is fluent and memory is intact. Cranial Nerves: II - Visual fields are full to confrontation. III, IV, VI - Extraocular movements are intact. There is no nystagmus. V - Facial sensation is intact to pinprick. VII - Face is asymmetrical, she has decreased movement of the left face, but at rest the face is symmetrical.  VIII - Hearing is present. IX, X, XII - Palate is symmetrical.   XI - Shoulder shrugging and head turning intact  Motor: The patient has 5/5 strength in all 4 limbs. Sensation is intact to pinprick.   Reflexes are 2+ and symmetrical.  Gait is normal.    CBC:   Lab Results   Component Value Date/Time    WBC 9.4 10/24/2016 03:00 PM    RBC 4.49 10/24/2016 03:00 PM    HGB 12.3 10/24/2016 03:00 PM HCT 39.0 10/24/2016 03:00 PM    PLATELET 753 39/58/6284 03:00 PM     BMP:   Lab Results   Component Value Date/Time    Glucose 79 01/05/2017 12:02 PM    Sodium 143 01/05/2017 12:02 PM    Potassium 4.3 01/05/2017 12:02 PM    Chloride 111 01/05/2017 12:02 PM    CO2 24 01/05/2017 12:02 PM    BUN 8 01/05/2017 12:02 PM    Creatinine 0.89 01/05/2017 12:02 PM    Calcium 9.0 01/05/2017 12:02 PM     CMP:   Lab Results   Component Value Date/Time    Glucose 79 01/05/2017 12:02 PM    Sodium 143 01/05/2017 12:02 PM    Potassium 4.3 01/05/2017 12:02 PM    Chloride 111 01/05/2017 12:02 PM    CO2 24 01/05/2017 12:02 PM    BUN 8 01/05/2017 12:02 PM    Creatinine 0.89 01/05/2017 12:02 PM    Calcium 9.0 01/05/2017 12:02 PM    Anion gap 8 01/05/2017 12:02 PM    BUN/Creatinine ratio 9 01/05/2017 12:02 PM    Alk. phosphatase 86 09/23/2016 12:18 PM    Protein, total 7.1 09/23/2016 12:18 PM    Albumin 3.6 09/23/2016 12:18 PM    Globulin 3.5 09/23/2016 12:18 PM    A-G Ratio 1.0 09/23/2016 12:18 PM     Coagulation:   Lab Results   Component Value Date/Time    Prothrombin time 13.4 09/23/2016 12:18 PM    INR 1.1 09/23/2016 12:18 PM     Cardiac markers:   Lab Results   Component Value Date/Time     10/30/2015 01:30 PM    CK-MB Index 0.5 10/30/2015 01:30 PM     Assessment:  Chronic migraine, some compliance issues. Sounds like the Topamax was working, but now with significant worsening of her headache and left sided weakness. Has a normal CT and MRI scan. Symptoms now resolved. Doing better on increased dose of Pamelor. Plan:   Continue current medication sand see back here in about 3 months. Reassured that these headaches will settle back down. Sincerely,        Annette Mata.  Elizabet Lau M.D.

## 2017-04-14 NOTE — PROGRESS NOTES
Re:  Dwaine Pat up visit     4/14/2017 1:51 PM          SSN: [id number]    Subjective:   Chet Nageotte returns for follow up of her migraines. The headaches seem to be better since starting the Pamelor. She seems to be sleeping better. She 's had less headaches over the last 6 weeks. She's having significant headaches 2 days per week and a amild headache 3 days per week. She feels as if she wishes to get back to school. Medications:    Current Outpatient Prescriptions   Medication Sig Dispense Refill    lamoTRIgine (LAMICTAL) 25 mg tablet Take 50 mg by mouth nightly.  butalbital-acetaminophen-caffeine (FIORICET, ESGIC) -40 mg per tablet Take 1 Tab by mouth every six (6) hours as needed for Pain or Headache. 10 Tab 0    nortriptyline (PAMELOR) 75 mg capsule Take 1 Cap by mouth nightly. 30 Cap 6    ADVAIR DISKUS 250-50 mcg/dose diskus inhaler INHALE ONE DOSE BY MOUTH TWICE DAILY 1 Inhaler 3    methocarbamol (ROBAXIN) 500 mg tablet Take 1 Tab by mouth four (4) times daily as needed. 30 Tab 0    simvastatin (ZOCOR) 20 mg tablet Take 1 Tab by mouth nightly. 30 Tab 2    topiramate (TOPAMAX) 200 mg tablet TAKE ONE TABLET BY MOUTH TWICE DAILY  Indications: MIGRAINE PREVENTION 60 Tab 6    pantoprazole (PROTONIX) 40 mg tablet Take 1 Tab by mouth daily. 30 Tab 3    docusate sodium (COLACE) 100 mg capsule Take 1 Cap by mouth two (2) times a day. 60 Cap 0    venlafaxine-SR (EFFEXOR-XR) 150 mg capsule Take 150 mg by mouth daily.  albuterol (PROVENTIL HFA, VENTOLIN HFA, PROAIR HFA) 90 mcg/actuation inhaler Take 2 Puffs by inhalation every four (4) hours as needed for Wheezing. 1 Inhaler 2    clonazePAM (KLONOPIN) 0.5 mg tablet Take  by mouth nightly as needed.          Vital signs:    Visit Vitals    /76 (BP 1 Location: Left arm, BP Patient Position: Sitting)    Pulse 81    Temp 98.8 °F (37.1 °C) (Oral)    Resp 14    Ht 5' 5\" (1.651 m)    Wt 77.5 kg (170 lb 12.8 oz)    LMP 09/28/2016    SpO2 98%    BMI 28.42 kg/m2       Review of Systems:   As above otherwise 11 point review of systems negative including;   Constitutional no fever or chills  Skin denies rash or itching  HEENT  Denies tinnitus, hearing lose  Eyes denies diplopia vision lose  Respiratory denies sortness of breath  Cardiovascular denies chest pain, dyspnea on exertion  Gastrointestinal denies nausea, vomiting, diarrhea, constipation  Genitourinary denies incontinence  Musculoskeletal denies joint pain or swelling  Endocrine denies weight change  Hematology denies easy bruising or bleeding   Neurological as above in HPI      Patient Active Problem List   Diagnosis Code    Asthma J45.909    Migraine G43.909    Hypotension I95.9    Fibromyalgia M79.7    Bipolar depression ([facility] [address] F31.30    History of stroke Z86.73    Gastroesophageal reflux disease with esophagitis K21.0    Hyperlipidemia E78.5    Chronic constipation K59.09    Anemia D64.9    Chronic obstructive pulmonary disease with acute exacerbation (HCC) J44.1    Cigarette smoker F17.210    Left-sided weakness R53.1         Objective: The patient is awake, alert, and oriented x 4. Fund of knowledge is adequate. Speech is fluent and memory is intact. Cranial Nerves: II - Visual fields are full to confrontation. III, IV, VI - Extraocular movements are intact. There is no nystagmus. V - Facial sensation is intact to pinprick. VII - Face is asymmetrical, she has decreased movement of the left face, but at rest the face is symmetrical.  VIII - Hearing is present. IX, X, XII - Palate is symmetrical.   XI - Shoulder shrugging and head turning intact  Motor: The patient has 5/5 strength in all 4 limbs. Sensation is intact to pinprick.   Reflexes are 2+ and symmetrical.  Gait is normal.    CBC:   Lab Results   Component Value Date/Time    WBC 9.4 10/24/2016 03:00 PM    RBC 4.49 10/24/2016 03:00 PM    HGB 12.3 10/24/2016 03:00 PM HCT 39.0 10/24/2016 03:00 PM    PLATELET 725 85/05/2760 03:00 PM     BMP:   Lab Results   Component Value Date/Time    Glucose 79 01/05/2017 12:02 PM    Sodium 143 01/05/2017 12:02 PM    Potassium 4.3 01/05/2017 12:02 PM    Chloride 111 01/05/2017 12:02 PM    CO2 24 01/05/2017 12:02 PM    BUN 8 01/05/2017 12:02 PM    Creatinine 0.89 01/05/2017 12:02 PM    Calcium 9.0 01/05/2017 12:02 PM     CMP:   Lab Results   Component Value Date/Time    Glucose 79 01/05/2017 12:02 PM    Sodium 143 01/05/2017 12:02 PM    Potassium 4.3 01/05/2017 12:02 PM    Chloride 111 01/05/2017 12:02 PM    CO2 24 01/05/2017 12:02 PM    BUN 8 01/05/2017 12:02 PM    Creatinine 0.89 01/05/2017 12:02 PM    Calcium 9.0 01/05/2017 12:02 PM    Anion gap 8 01/05/2017 12:02 PM    BUN/Creatinine ratio 9 01/05/2017 12:02 PM    Alk. phosphatase 86 09/23/2016 12:18 PM    Protein, total 7.1 09/23/2016 12:18 PM    Albumin 3.6 09/23/2016 12:18 PM    Globulin 3.5 09/23/2016 12:18 PM    A-G Ratio 1.0 09/23/2016 12:18 PM     Coagulation:   Lab Results   Component Value Date/Time    Prothrombin time 13.4 09/23/2016 12:18 PM    INR 1.1 09/23/2016 12:18 PM     Cardiac markers:   Lab Results   Component Value Date/Time     10/30/2015 01:30 PM    CK-MB Index 0.5 10/30/2015 01:30 PM     Assessment:  Chronic migraine, some compliance issues. Sounds like the Topamax was working, but now with significant worsening of her headache and left sided weakness. Has a normal CT and MRI scan. Symptoms now resolved. Doing better on increased dose of Pamelor. Plan:   Continue current medication sand see back here in about 3 months. Reassured that these headaches will settle back down. Sincerely,        Анна Gentile.  Chloe Lee M.D.

## 2017-04-14 NOTE — LETTER
4/14/2017 2:01 PM 
 
Patient:  Dinah Lorenzo YOB: 1975 Date of Visit: 4/14/2017 Dear Tanner Pearce, NP 
[address] [address] VIA In Basket 
 : Thank you for referring Ms. Lopez Daniel to me for evaluation/treatment. Below are the relevant portions of my assessment and plan of care. Re:  Suni Elton up visit     4/14/2017 1:51 PM 
 
 
 
 
SSN: [id number] Subjective:   Dinah Lorenzo returns for follow up of her migraines. The headaches seem to be better since starting the Pamelor. She seems to be sleeping better. She 's had less headaches over the last 6 weeks. She's having significant headaches 2 days per week and a amild headache 3 days per week. She feels as if she wishes to get back to school. Medications:   
Current Outpatient Prescriptions Medication Sig Dispense Refill  lamoTRIgine (LAMICTAL) 25 mg tablet Take 50 mg by mouth nightly.  butalbital-acetaminophen-caffeine (FIORICET, ESGIC) -40 mg per tablet Take 1 Tab by mouth every six (6) hours as needed for Pain or Headache. 10 Tab 0  
 nortriptyline (PAMELOR) 75 mg capsule Take 1 Cap by mouth nightly. 30 Cap 6  ADVAIR DISKUS 250-50 mcg/dose diskus inhaler INHALE ONE DOSE BY MOUTH TWICE DAILY 1 Inhaler 3  
 methocarbamol (ROBAXIN) 500 mg tablet Take 1 Tab by mouth four (4) times daily as needed. 30 Tab 0  
 simvastatin (ZOCOR) 20 mg tablet Take 1 Tab by mouth nightly. 30 Tab 2  
 topiramate (TOPAMAX) 200 mg tablet TAKE ONE TABLET BY MOUTH TWICE DAILY  Indications: MIGRAINE PREVENTION 60 Tab 6  pantoprazole (PROTONIX) 40 mg tablet Take 1 Tab by mouth daily. 30 Tab 3  
 docusate sodium (COLACE) 100 mg capsule Take 1 Cap by mouth two (2) times a day. 60 Cap 0  
 venlafaxine-SR (EFFEXOR-XR) 150 mg capsule Take 150 mg by mouth daily.     
 albuterol (PROVENTIL HFA, VENTOLIN HFA, PROAIR HFA) 90 mcg/actuation inhaler Take 2 Puffs by inhalation every four (4) hours as needed for Wheezing. 1 Inhaler 2  clonazePAM (KLONOPIN) 0.5 mg tablet Take  by mouth nightly as needed. Vital signs:   
Visit Vitals  /76 (BP 1 Location: Left arm, BP Patient Position: Sitting)  Pulse 81  Temp 98.8 °F (37.1 °C) (Oral)  Resp 14  
 Ht 5' 5\" (1.651 m)  Wt 77.5 kg (170 lb 12.8 oz)  LMP 09/28/2016  SpO2 98%  BMI 28.42 kg/m2 Review of Systems: As above otherwise 11 point review of systems negative including;  
Constitutional no fever or chills Skin denies rash or itching HEENT  Denies tinnitus, hearing lose Eyes denies diplopia vision lose Respiratory denies sortness of breath Cardiovascular denies chest pain, dyspnea on exertion Gastrointestinal denies nausea, vomiting, diarrhea, constipation Genitourinary denies incontinence Musculoskeletal denies joint pain or swelling Endocrine denies weight change Hematology denies easy bruising or bleeding Neurological as above in HPI Patient Active Problem List  
Diagnosis Code  Asthma J45.909  Migraine G43.909  Hypotension I95.9  Fibromyalgia M79.7  Bipolar depression (HCC) F31.30  
 History of stroke Z86.73  
 Gastroesophageal reflux disease with esophagitis K21.0  Hyperlipidemia E78.5  Chronic constipation K59.09  
 Anemia D64.9  Chronic obstructive pulmonary disease with acute exacerbation (HCC) J44.1  Cigarette smoker F17.210  Left-sided weakness R53.1 Objective: The patient is awake, alert, and oriented x 4. Fund of knowledge is adequate. Speech is fluent and memory is intact. Cranial Nerves: II  Visual fields are full to confrontation. III, IV, VI  Extraocular movements are intact. There is no nystagmus. V  Facial sensation is intact to pinprick.   VII  Face is asymmetrical, she has decreased movement of the left face, but at rest the face is symmetrical. VIII - Hearing is present. IX, X, XII  Palate is symmetrical.   XI - Shoulder shrugging and head turning intact Motor: The patient has 5/5 strength in all 4 limbs. Sensation is intact to pinprick. Reflexes are 2+ and symmetrical.  Gait is normal. 
 
CBC:  
Lab Results Component Value Date/Time WBC 9.4 10/24/2016 03:00 PM  
 RBC 4.49 10/24/2016 03:00 PM  
 HGB 12.3 10/24/2016 03:00 PM  
 HCT 39.0 10/24/2016 03:00 PM  
 PLATELET 551 73/55/5491 03:00 PM  
 
BMP:  
Lab Results Component Value Date/Time Glucose 79 01/05/2017 12:02 PM  
 Sodium 143 01/05/2017 12:02 PM  
 Potassium 4.3 01/05/2017 12:02 PM  
 Chloride 111 01/05/2017 12:02 PM  
 CO2 24 01/05/2017 12:02 PM  
 BUN 8 01/05/2017 12:02 PM  
 Creatinine 0.89 01/05/2017 12:02 PM  
 Calcium 9.0 01/05/2017 12:02 PM  
 
CMP:  
Lab Results Component Value Date/Time Glucose 79 01/05/2017 12:02 PM  
 Sodium 143 01/05/2017 12:02 PM  
 Potassium 4.3 01/05/2017 12:02 PM  
 Chloride 111 01/05/2017 12:02 PM  
 CO2 24 01/05/2017 12:02 PM  
 BUN 8 01/05/2017 12:02 PM  
 Creatinine 0.89 01/05/2017 12:02 PM  
 Calcium 9.0 01/05/2017 12:02 PM  
 Anion gap 8 01/05/2017 12:02 PM  
 BUN/Creatinine ratio 9 01/05/2017 12:02 PM  
 Alk. phosphatase 86 09/23/2016 12:18 PM  
 Protein, total 7.1 09/23/2016 12:18 PM  
 Albumin 3.6 09/23/2016 12:18 PM  
 Globulin 3.5 09/23/2016 12:18 PM  
 A-G Ratio 1.0 09/23/2016 12:18 PM  
 
Coagulation:  
Lab Results Component Value Date/Time Prothrombin time 13.4 09/23/2016 12:18 PM  
 INR 1.1 09/23/2016 12:18 PM  
 
Cardiac markers:  
Lab Results Component Value Date/Time  10/30/2015 01:30 PM  
 CK-MB Index 0.5 10/30/2015 01:30 PM  
 
Assessment:  Chronic migraine, some compliance issues. Sounds like the Topamax was working, but now with significant worsening of her headache and left sided weakness. Has a normal CT and MRI scan. Symptoms now resolved. Doing better on increased dose of Pamelor. Plan:   Continue current medication sand see back here in about 3 months. Reassured that these headaches will settle back down. Sincerely, 
 
 
 
Maico Carrera. Jocelin Liriano M.D. If you have questions, please do not hesitate to call me. I look forward to following Ms. Tonja Evans along with you.  
 
 
 
Sincerely, 
 
 
Michaelle Singleton MD

## 2017-04-14 NOTE — MR AVS SNAPSHOT
Visit Information Date & Time Provider Department Dept. Phone Encounter #  
 4/14/2017  1:20 PM Jose Nugent MD [facility] [phone] [id number] Follow-up Instructions Return in about 3 months (around 7/14/2017). Follow-up and Disposition History Your Appointments 4/17/2017  4:00 PM  
[place] [address] [facility]) Appt Note: Patient is requesting an appointment to be seen by [facility] [address] [facility] [address]  
  
   
 [address] [facility] [address]  
  
    
 6/9/2017 11:00 AM  
ANNUAL with Broderick Garcia DO  
[facility] ([facility]) Appt Note: annual  
 [facility] [address] [phone]  
[phone]  
  
   
 [facility] [address] [phone] Upcoming Health Maintenance Date Due Pneumococcal 19-64 Medium Risk (1 of 1 - PPSV23) 8/26/1995 DTaP/Tdap/Td series (1 - Tdap) 11/17/2015 PAP AKA CERVICAL CYTOLOGY 7/1/2019 Allergies as of 4/14/2017  Review Complete On: 4/14/2017 By: Corazon Merino LPN Severity Noted Reaction Type Reactions Relpax [Eletriptan Hbr] High 02/25/2013    Anaphylaxis Relpax [Eletriptan Hbr]  04/24/2014    Sneezing Ultram [Tramadol]  02/25/2013    Nausea Only Ultram [Tramadol]  04/24/2014    Nausea and Vomiting Current Immunizations  Reviewed on 10/12/2016 Name Date Td, Adsorbed PF 11/16/2015  1:25 PM  
  
 Not reviewed this visit You Were Diagnosed With   
  
 Codes Comments Wheezing     ICD-10-CM: R06.2 ICD-9-CM: 786.07 Vitals BP Pulse Temp Resp Height(growth percentile) Weight(growth percentile) 100/76 (BP 1 Location: Left arm, BP Patient Position: Sitting) 81 98.8 °F (37.1 °C) (Oral) 14 5' 5\" (1.651 m) 170 lb 12.8 oz (77.5 kg) LMP SpO2 BMI OB Status Smoking Status 09/28/2016 98% 28.42 kg/m2 Hysterectomy Current Every Day Smoker Vitals History BMI and BSA Data Body Mass Index Body Surface Area  
 28.42 kg/m 2 1.89 m 2 Preferred Pharmacy Pharmacy Name Phone [facility] [address], [place] [phone] Your Updated Medication List  
  
   
This list is accurate as of: 4/14/17  2:06 PM.  Always use your most recent med list.  
  
  
  
  
 Mariajose Hu 250-50 mcg/dose diskus inhaler Generic drug:  fluticasone-salmeterol INHALE ONE DOSE BY MOUTH TWICE DAILY  
  
 albuterol 90 mcg/actuation inhaler Commonly known as:  PROVENTIL HFA, VENTOLIN HFA, PROAIR HFA Take 2 Puffs by inhalation every four (4) hours as needed for Wheezing. butalbital-acetaminophen-caffeine -40 mg per tablet Commonly known as:  Chasity Lento Take 1 Tab by mouth every six (6) hours as needed for Pain or Headache. docusate sodium 100 mg capsule Commonly known as:  Amirah Mason Take 1 Cap by mouth two (2) times a day. KlonoPIN 0.5 mg tablet Generic drug:  clonazePAM  
Take  by mouth nightly as needed. LaMICtal 25 mg tablet Generic drug:  lamoTRIgine Take 50 mg by mouth nightly. methocarbamol 500 mg tablet Commonly known as:  ROBAXIN Take 1 Tab by mouth four (4) times daily as needed. nortriptyline 75 mg capsule Commonly known as:  PAMELOR Take 1 Cap by mouth nightly. pantoprazole 40 mg tablet Commonly known as:  PROTONIX Take 1 Tab by mouth daily. simvastatin 20 mg tablet Commonly known as:  ZOCOR Take 1 Tab by mouth nightly. topiramate 200 mg tablet Commonly known as:  TOPAMAX TAKE ONE TABLET BY MOUTH TWICE DAILY  Indications: MIGRAINE PREVENTION  
  
 venlafaxine- mg capsule Commonly known as:  EFFEXOR-XR Take 150 mg by mouth daily. Prescriptions Printed Refills butalbital-acetaminophen-caffeine (FIORICET, ESGIC) -40 mg per tablet 0 Sig: Take 1 Tab by mouth every six (6) hours as needed for Pain or Headache. Class: Print Route: Oral  
  
Follow-up Instructions Return in about 3 months (around 7/14/2017). Introducing [facility]! [employer] introduces [employer] patient portal. Now you can access parts of your medical record, email your doctor's office, and request medication refills online. 1. In your internet browser, go to [email or web address] 2. Click on the First Time User? Click Here link in the Sign In box. You will see the New Member Sign Up page. 3. Enter your [employer] Access Code exactly as it appears below. You will not need to use this code after youve completed the sign-up process. If you do not sign up before the expiration date, you must request a new code. · [employer] Access Code: [email or web address] Expires: 5/8/2017  1:58 PM 
 
4. Enter the last four digits of your Social Security Number (xxxx) and Date of Birth (mm/dd/yyyy) as indicated and click Submit. You will be taken to the next sign-up page. 5. Create a [employer] ID. This will be your [employer] login ID and cannot be changed, so think of one that is secure and easy to remember. 6. Create a [employer] password. You can change your password at any time. 7. Enter your Password Reset Question and Answer. This can be used at a later time if you forget your password. 8. Enter your e-mail address. You will receive e-mail notification when new information is available in [address] 9. Click Sign Up. You can now view and download portions of your medical record. 10. Click the Download Summary menu link to download a portable copy of your medical information. If you have questions, please visit the Frequently Asked Questions section of the [employer] website. Remember, [employer] is NOT to be used for urgent needs. For medical emergencies, dial 911. Now available from your iPhone and Android! Please provide this summary of care documentation to your next provider. Your primary care clinician is listed as Noelle Moscoso. If you have any questions after today's visit, please call [phone].

## 2017-04-14 NOTE — LETTER
NOTIFICATION OF RETURN TO WORK / SCHOOL 
 
4/14/2017 1:53 PM 
 
Ms. Bernardine Sandhoff [address] [facility] [address] [phone] [place] To Whom It May Concern: 
 
Bernardine Sandhoff was under the care of [address] [facility] from 4/14/2017. She will be able to return to work/school on 4/17/2017 with no restrictions. If there are questions or concerns please have the patient contact our office.  
 
Sincerely, 
 
 
Jose Nugent MD

## 2017-04-17 ENCOUNTER — TELEPHONE (OUTPATIENT)
Dept: FAMILY MEDICINE CLINIC | Age: 42
End: 2017-04-17

## 2017-04-17 ENCOUNTER — OFFICE VISIT (OUTPATIENT)
Dept: FAMILY MEDICINE CLINIC | Age: 42
End: 2017-04-17

## 2017-04-17 VITALS
HEART RATE: 88 BPM | DIASTOLIC BLOOD PRESSURE: 62 MMHG | RESPIRATION RATE: 19 BRPM | BODY MASS INDEX: 28.69 KG/M2 | TEMPERATURE: 98 F | SYSTOLIC BLOOD PRESSURE: 111 MMHG | OXYGEN SATURATION: 99 % | WEIGHT: 172.2 LBS | HEIGHT: 65 IN

## 2017-04-17 DIAGNOSIS — A08.4 VIRAL GASTROENTERITIS: ICD-10-CM

## 2017-04-17 DIAGNOSIS — R11.2 NON-INTRACTABLE VOMITING WITH NAUSEA, UNSPECIFIED VOMITING TYPE: Primary | ICD-10-CM

## 2017-04-17 RX ORDER — ONDANSETRON 4 MG/1
4 TABLET, ORALLY DISINTEGRATING ORAL
Qty: 20 TAB | Refills: 0 | Status: SHIPPED | OUTPATIENT
Start: 2017-04-17 | End: 2017-08-01

## 2017-04-17 NOTE — PROGRESS NOTES
Dara Dean is a 37 y.o. female who presents today with complaints of nausea and vomiting. Patient stated that she vomited 8 times this moning. Patient stated that she ate store bought macaroni salad. 1. Have you been to the ER, urgent care clinic since your last visit? Hospitalized since your last visit? No    2. Have you seen or consulted any other health care providers outside of the [address] [place] [address] since your last visit? Include any pap smears or colon screening.  No    Health Maintenance reviewed - Yes    Health Maintenance Due   Topic Date Due    Pneumococcal 19-64 Medium Risk (1 of 1 - PPSV23) 08/26/1995    DTaP/Tdap/Td series (1 - Tdap) 11/17/2015

## 2017-04-17 NOTE — PATIENT INSTRUCTIONS
Gastroenteritis: Care Instructions  Your Care Instructions  Gastroenteritis is an illness that may cause nausea, vomiting, and diarrhea. It is sometimes called \"stomach flu. \" It can be caused by bacteria or a virus. You will probably begin to feel better in 1 to 2 days. In the meantime, get plenty of rest and make sure you do not become dehydrated. Dehydration occurs when your body loses too much fluid. Follow-up care is a key part of your treatment and safety. Be sure to make and go to all appointments, and call your doctor if you are having problems. Its also a good idea to know your test results and keep a list of the medicines you take. How can you care for yourself at home? · If your doctor prescribed antibiotics, take them as directed. Do not stop taking them just because you feel better. You need to take the full course of antibiotics. · Drink plenty of fluids to prevent dehydration, enough so that your urine is light yellow or clear like water. Choose water and other caffeine-free clear liquids until you feel better. If you have kidney, heart, or liver disease and have to limit fluids, talk with your doctor before you increase your fluid intake. · Drink fluids slowly, in frequent, small amounts, because drinking too much too fast can cause vomiting. · Begin eating mild foods, such as dry toast, yogurt, applesauce, bananas, and rice. Avoid spicy, hot, or high-fat foods, and do not drink alcohol or caffeine for a day or two. Do not drink milk or eat ice cream until you are feeling better. How to prevent gastroenteritis  · Keep hot foods hot and cold foods cold. · Do not eat meats, dressings, salads, or other foods that have been kept at room temperature for more than 2 hours. · Use a thermometer to check your refrigerator. It should be between 34°F and 40°F.  · Defrost meats in the refrigerator or microwave, not on the kitchen counter. · Keep your hands and your kitchen clean.  Wash your hands, cutting boards, and countertops with hot soapy water frequently. · Cook meat until it is well done. · Do not eat raw eggs or uncooked sauces made with raw eggs. · Do not take chances. If food looks or tastes spoiled, throw it out. When should you call for help? Call 911 anytime you think you may need emergency care. For example, call if:  · You vomit blood or what looks like coffee grounds. · You passed out (lost consciousness). · You pass maroon or very bloody stools. Call your doctor now or seek immediate medical care if:  · You have severe belly pain. · You have signs of needing more fluids. You have sunken eyes, a dry mouth, and pass only a little dark urine. · You feel like you are going to faint. · You have increased belly pain that does not go away in 1 to 2 days. · You have new or increased nausea, or you are vomiting. · You have a new or higher fever. · Your stools are black and tarlike or have streaks of blood. Watch closely for changes in your health, and be sure to contact your doctor if:  · You are dizzy or lightheaded. · You urinate less than usual, or your urine is dark yellow or brown. · You do not feel better with each day that goes by. Where can you learn more? Go to http://romario-clover.info/. Enter N142 in the search box to learn more about \"Gastroenteritis: Care Instructions. \"  Current as of: May 24, 2016  Content Version: 11.2  © 5870-5019 [employer]. Care instructions adapted under license by [employer] (which disclaims liability or warranty for this information). If you have questions about a medical condition or this instruction, always ask your healthcare professional. [address] any warranty or liability for your use of this information.

## 2017-04-17 NOTE — TELEPHONE ENCOUNTER
Patient is calling stating that she called [facility] for her referral for her Pain Management appointment. Patient is stating that [facility] did not receive her referral information. Patient was hung up on in the process.  Patient would like to know the status of her referral.     Please assist.

## 2017-04-17 NOTE — LETTER
NOTIFICATION RETURN TO SCHOOL 
 
4/17/2017 11:32 AM 
 
Ms. Karmen Colmenares [address] [facility] [address] [phone] To Whom It May Concern: 
 
Karmen Colmenares is currently under the care of Marilyn Lanza. She will return to school on: April 18, 2017 If there are questions or concerns please have the patient contact our office.  
 
 
 
Sincerely, 
 
 
 
Filomena Aragon NP

## 2017-04-17 NOTE — TELEPHONE ENCOUNTER
Called Ms. Abida Lorenzo to let her know that I have re-faxed the necessary paper work to the office,  After reviewing her information she will be contacted by their office will contact her to schedule an appointment. I also explained to her that she must go to any appointments that were scheduled because of the high demand for appointments with them. Ms Abida Lorenzo stated that she was contacted by pain management but was unable to contact them because she was out of town . She stated that she made several unsuccessful attempts to contact them afterward. Patient verbalized understanding of information given and gave verbal read back.

## 2017-04-17 NOTE — MR AVS SNAPSHOT
Visit Information Date & Time Provider Department Dept. Phone Encounter #  
 4/17/2017  4:00 PM Will Reynoso NP [address] [address]  Follow-up Instructions Return if symptoms worsen or fail to improve. Your Appointments 4/17/2017  4:00 PM  
ROUTINE CARE with [place] Reynoso NP [id number] [address] [facility]) Appt Note: Patient is requesting an appointment to be seen by [facility] [address] [facility] [address]  
  
   
 [address]  
  
    
 6/9/2017 11:00 AM  
ANNUAL with Fannie Arnold DO  
[facility] ([address] [facility]) Appt Note: annual  
 [address] [facility] [facility]  
  
   
 [address] [facility] 83 [address]  
  
    
 6/23/2017  1:40 PM  
Follow Up with Isael Chau MD  
[facility] [address] [facility]) Appt Note: 3 month f/u  
 [address] [id number] [phone]  
  
   
 [facility] [address] Upcoming Health Maintenance Date Due Pneumococcal 19-64 Medium Risk (1 of 1 - PPSV23) 8/26/1995 DTaP/Tdap/Td series (1 - Tdap) 11/17/2015 PAP AKA CERVICAL CYTOLOGY 7/1/2019 Allergies as of 4/17/2017  Review Complete On: 4/17/2017 By: Will Reynoso NP Severity Noted Reaction Type Reactions Relpax [Eletriptan Hbr] High 02/25/2013    Anaphylaxis Relpax [Eletriptan Hbr]  04/24/2014    Sneezing Ultram [Tramadol]  02/25/2013    Nausea Only Ultram [Tramadol]  04/24/2014    Nausea and Vomiting Current Immunizations  Reviewed on 10/12/2016 Name Date Td, Adsorbed PF 11/16/2015  1:25 PM  
  
 Not reviewed this visit You Were Diagnosed With   
  
 Codes Comments Non-intractable vomiting with nausea, unspecified vomiting type    -  Primary ICD-10-CM: R11.2 ICD-9-CM: 787.01   
 Viral gastroenteritis     ICD-10-CM: A08.4 ICD-9-CM: 683. 8 Vitals BP Pulse Temp Resp Height(growth percentile) Weight(growth percentile) 111/62 (BP 1 Location: Right arm, BP Patient Position: Sitting) 88 98 °F (36.7 °C) (Oral) 19 5' 5\" (1.651 m) 172 lb 3.2 oz (78.1 kg) LMP SpO2 BMI OB Status Smoking Status 09/28/2016 99% 28.66 kg/m2 Hysterectomy Current Every Day Smoker Vitals History BMI and BSA Data Body Mass Index Body Surface Area  
 28.66 kg/m 2 1.89 m 2 Preferred Pharmacy Pharmacy Name Phone [facility] [address], [address] [place] [address] [phone] Your Updated Medication List  
  
   
This list is accurate as of: 4/17/17 11:33 AM.  Always use your most recent med list.  
  
  
  
  
 ADVAIR DISKUS 250-50 mcg/dose diskus inhaler Generic drug:  fluticasone-salmeterol INHALE ONE DOSE BY MOUTH TWICE DAILY  
  
 albuterol 90 mcg/actuation inhaler Commonly known as:  PROVENTIL HFA, VENTOLIN HFA, PROAIR HFA Take 2 Puffs by inhalation every four (4) hours as needed for Wheezing. butalbital-acetaminophen-caffeine -40 mg per tablet Commonly known as:  Costella Jeans Take 1 Tab by mouth every six (6) hours as needed for Pain or Headache. docusate sodium 100 mg capsule Commonly known as:  Tiff [place] Take 1 Cap by mouth two (2) times a day. KlonoPIN 0.5 mg tablet Generic drug:  clonazePAM  
Take  by mouth nightly as needed. LaMICtal 25 mg tablet Generic drug:  lamoTRIgine Take 50 mg by mouth nightly. methocarbamol 500 mg tablet Commonly known as:  ROBAXIN Take 1 Tab by mouth four (4) times daily as needed. nortriptyline 75 mg capsule Commonly known as:  PAMELOR Take 1 Cap by mouth nightly. ondansetron 4 mg disintegrating tablet Commonly known as:  ZOFRAN ODT Take 1 Tab by mouth every eight (8) hours as needed for Nausea. pantoprazole 40 mg tablet Commonly known as:  PROTONIX Take 1 Tab by mouth daily. simvastatin 20 mg tablet Commonly known as:  ZOCOR Take 1 Tab by mouth nightly. topiramate 200 mg tablet Commonly known as:  TOPAMAX TAKE ONE TABLET BY MOUTH TWICE DAILY  Indications: MIGRAINE PREVENTION  
  
 venlafaxine- mg capsule Commonly known as:  EFFEXOR-XR Take 150 mg by mouth daily. Prescriptions Sent to Pharmacy Refills  
 ondansetron (ZOFRAN ODT) 4 mg disintegrating tablet 0 Sig: Take 1 Tab by mouth every eight (8) hours as needed for Nausea. Class: Normal  
 Pharmacy: [facility] [address], [address]  #: [phone] Route: Oral  
  
Follow-up Instructions Return if symptoms worsen or fail to improve. Patient Instructions Gastroenteritis: Care Instructions Your Care Instructions Gastroenteritis is an illness that may cause nausea, vomiting, and diarrhea. It is sometimes called \"stomach flu. \" It can be caused by bacteria or a virus. You will probably begin to feel better in 1 to 2 days. In the meantime, get plenty of rest and make sure you do not become dehydrated. Dehydration occurs when your body loses too much fluid. Follow-up care is a key part of your treatment and safety. Be sure to make and go to all appointments, and call your doctor if you are having problems. Its also a good idea to know your test results and keep a list of the medicines you take. How can you care for yourself at home? · If your doctor prescribed antibiotics, take them as directed. Do not stop taking them just because you feel better. You need to take the full course of antibiotics. · Drink plenty of fluids to prevent dehydration, enough so that your urine is light yellow or clear like water. Choose water and other caffeine-free clear liquids until you feel better.  If you have kidney, heart, or liver disease and have to limit fluids, talk with your doctor before you increase your fluid intake. · Drink fluids slowly, in frequent, small amounts, because drinking too much too fast can cause vomiting. · Begin eating mild foods, such as dry toast, yogurt, applesauce, bananas, and rice. Avoid spicy, hot, or high-fat foods, and do not drink alcohol or caffeine for a day or two. Do not drink milk or eat ice cream until you are feeling better. How to prevent gastroenteritis · Keep hot foods hot and cold foods cold. · Do not eat meats, dressings, salads, or other foods that have been kept at room temperature for more than 2 hours. · Use a thermometer to check your refrigerator. It should be between 34°F and 40°F. 
· Defrost meats in the refrigerator or microwave, not on the kitchen counter. · Keep your hands and your kitchen clean. Wash your hands, cutting boards, and countertops with hot soapy water frequently. · Cook meat until it is well done. · Do not eat raw eggs or uncooked sauces made with raw eggs. · Do not take chances. If food looks or tastes spoiled, throw it out. When should you call for help? Call 911 anytime you think you may need emergency care. For example, call if: 
· You vomit blood or what looks like coffee grounds. · You passed out (lost consciousness). · You pass maroon or very bloody stools. Call your doctor now or seek immediate medical care if: 
· You have severe belly pain. · You have signs of needing more fluids. You have sunken eyes, a dry mouth, and pass only a little dark urine. · You feel like you are going to faint. · You have increased belly pain that does not go away in 1 to 2 days. · You have new or increased nausea, or you are vomiting. · You have a new or higher fever. · Your stools are black and tarlike or have streaks of blood. Watch closely for changes in your health, and be sure to contact your doctor if: 
· You are dizzy or lightheaded. · You urinate less than usual, or your urine is dark yellow or brown. · You do not feel better with each day that goes by. Where can you learn more? Go to http://romario-clover.info/. Enter N142 in the search box to learn more about \"Gastroenteritis: Care Instructions. \" Current as of: May 24, 2016 Content Version: 11.2 © 0811-3372 [employer]. Care instructions adapted under license by [employer] (which disclaims liability or warranty for this information). If you have questions about a medical condition or this instruction, always ask your healthcare professional. [place] [address] any warranty or liability for your use of this information. Introducing [facility]! Vibha Leon introduces [employer] patient portal. Now you can access parts of your medical record, email your doctor's office, and request medication refills online. 1. In your internet browser, go to [email or web address]. [employer] 2. Click on the First Time User? Click Here link in the Sign In box. You will see the New Member Sign Up page. 3. Enter your [employer] Access Code exactly as it appears below. You will not need to use this code after youve completed the sign-up process. If you do not sign up before the expiration date, you must request a new code. · [employer] Access Code: [email or web address] Expires: 5/8/2017  1:58 PM 
 
4. Enter the last four digits of your Social Security Number (xxxx) and Date of Birth (mm/dd/yyyy) as indicated and click Submit. You will be taken to the next sign-up page. 5. Create a [employer] ID. This will be your [employer] login ID and cannot be changed, so think of one that is secure and easy to remember. 6. Create a [employer] password. You can change your password at any time. 7. Enter your Password Reset Question and Answer. This can be used at a later time if you forget your password. 8. Enter your e-mail address. You will receive e-mail notification when new information is available in [address] 9. Click Sign Up. You can now view and download portions of your medical record. 10. Click the Download Summary menu link to download a portable copy of your medical information. If you have questions, please visit the Frequently Asked Questions section of the [employer] website. Remember, [employer] is NOT to be used for urgent needs. For medical emergencies, dial 911. Now available from your iPhone and Android! Please provide this summary of care documentation to your next provider. Your primary care clinician is listed as Betty Thompson. If you have any questions after today's visit, please call [phone].
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
## 2017-05-17 ENCOUNTER — OFFICE VISIT (OUTPATIENT)
Dept: FAMILY MEDICINE CLINIC | Age: 42
End: 2017-05-17

## 2017-05-17 VITALS
RESPIRATION RATE: 20 BRPM | WEIGHT: 174.6 LBS | TEMPERATURE: 97.4 F | HEIGHT: 65 IN | BODY MASS INDEX: 29.09 KG/M2 | DIASTOLIC BLOOD PRESSURE: 70 MMHG | HEART RATE: 86 BPM | OXYGEN SATURATION: 100 % | SYSTOLIC BLOOD PRESSURE: 103 MMHG

## 2017-05-17 DIAGNOSIS — F31.9 BIPOLAR DEPRESSION (HCC): ICD-10-CM

## 2017-05-17 DIAGNOSIS — G43.009 MIGRAINE WITHOUT AURA AND WITHOUT STATUS MIGRAINOSUS, NOT INTRACTABLE: ICD-10-CM

## 2017-05-17 DIAGNOSIS — E78.2 MIXED HYPERLIPIDEMIA: ICD-10-CM

## 2017-05-17 DIAGNOSIS — M62.838 MUSCLE SPASMS OF NECK: Primary | ICD-10-CM

## 2017-05-17 RX ORDER — METHOCARBAMOL 500 MG/1
500 TABLET, FILM COATED ORAL
Qty: 30 TAB | Refills: 0 | Status: SHIPPED | OUTPATIENT
Start: 2017-05-17

## 2017-05-17 NOTE — PATIENT INSTRUCTIONS
Neck Spasm: Exercises  Your Care Instructions  Here are some examples of typical rehabilitation exercises for your condition. Start each exercise slowly. Ease off the exercise if you start to have pain. Your doctor or physical therapist will tell you when you can start these exercises and which ones will work best for you. How to do the exercises  Levator scapula stretch    1. Sit in a firm chair, or stand up straight. 2. Gently tilt your head toward your left shoulder. 3. Turn your head to look down into your armpit, bending your head slightly forward. Let the weight of your head stretch your neck muscles. 4. Hold for 15 to 30 seconds. 5. Return to your starting position. 6. Follow the same instructions above, but tilt your head toward your right shoulder. 7. Repeat 2 to 4 times toward each shoulder. Upper trapezius stretch    1. Sit in a firm chair, or stand up straight. 2. This stretch works best if you keep your shoulder down as you lean away from it. To help you remember to do this, start by relaxing your shoulders and lightly holding on to your thighs or your chair. 3. Tilt your head toward your shoulder and hold for 15 to 30 seconds. Let the weight of your head stretch your muscles. 4. If you would like a little added stretch, place your arm behind your back. Use the arm opposite of the direction you are tilting your head. For example, if you are tilting your head to the left, place your right arm behind your back. 5. Repeat 2 to 4 times toward each shoulder. Neck rotation    1. Sit in a firm chair, or stand up straight. 2. Keeping your chin level, turn your head to the right, and hold for 15 to 30 seconds. 3. Turn your head to the left, and hold for 15 to 30 seconds. 4. Repeat 2 to 4 times to each side. Chin tuck    1. Lie on the floor with a rolled-up towel under your neck. Your head should be touching the floor. 2. Slowly bring your chin toward the front of your neck.   3. Hold for a count of 6, and then relax for up to 10 seconds. 4. Repeat 8 to 12 times. Forward neck flexion    1. Sit in a firm chair, or stand up straight. 2. Bend your head forward. 3. Hold for 15 to 30 seconds, then return to your starting position. 4. Repeat 2 to 4 times. Follow-up care is a key part of your treatment and safety. Be sure to make and go to all appointments, and call your doctor if you are having problems. It's also a good idea to know your test results and keep a list of the medicines you take. Where can you learn more? Go to http://romario-clover.info/. Enter P962 in the search box to learn more about \"Neck Spasm: Exercises. \"  Current as of: May 23, 2016  Content Version: 11.2  © 2561-1113 [employer]. Care instructions adapted under license by [employer] (which disclaims liability or warranty for this information). If you have questions about a medical condition or this instruction, always ask your healthcare professional. [address] any warranty or liability for your use of this information.

## 2017-05-17 NOTE — MR AVS SNAPSHOT
Visit Information Date & Time Provider Department Dept. Phone Encounter #  
 5/17/2017  4:00 PM Nash Rivera NP [address] [address]  Follow-up Instructions Return in about 2 months (around 7/17/2017) for SCHEDULE LAB APPOINTMENT FOR BEGINNING OF JULY AND FOLLOW UP AFTER, EOV. Your Appointments 5/17/2017  4:00 PM  
ROUTINE CARE with Nash Rivera NP [address] [address] [facility]) Appt Note: Follow up  
 [address]  
  
   
 [address]  
  
    
 6/9/2017 11:00 AM  
ANNUAL with [facility] ([facility] Appt Note: annual  
 [address] [facility] [address] [address]  
  
   
 [address] [phone]  
  
    
 6/23/2017  1:40 PM  
Follow Up with Jayce Grant MD  
[facility]) Appt Note: 3 month f/u  
 [address] [facility] [address] [phone]  
  
   
 [facility] [address] Upcoming Health Maintenance Date Due Pneumococcal 19-64 Medium Risk (1 of 1 - PPSV23) 8/26/1995 DTaP/Tdap/Td series (1 - Tdap) 11/17/2015 INFLUENZA AGE 9 TO ADULT 8/1/2017 PAP AKA CERVICAL CYTOLOGY 7/1/2019 Allergies as of 5/17/2017  Review Complete On: 5/17/2017 By: Nash Rivera NP Severity Noted Reaction Type Reactions Relpax [Eletriptan Hbr] High 02/25/2013    Anaphylaxis Relpax [Eletriptan Hbr]  04/24/2014    Sneezing Ultram [Tramadol]  02/25/2013    Nausea Only Ultram [Tramadol]  04/24/2014    Nausea and Vomiting Current Immunizations  Reviewed on 10/12/2016 Name Date Td, Adsorbed PF 11/16/2015  1:25 PM  
  
 Not reviewed this visit You Were Diagnosed With   
  
 Codes Comments  Muscle spasms of neck    -  Primary ICD-10-CM: E58.409 
 ICD-9-CM: 728.85 Bipolar depression ([facility] [address]     ICD-10-CM: F31.30 ICD-9-CM: 296.50 Migraine without aura and without status migrainosus, not intractable     ICD-10-CM: Z08.326 ICD-9-CM: 346.10 Mixed hyperlipidemia     ICD-10-CM: E78.2 ICD-9-CM: 272.2 Vitals BP Pulse Temp Resp Height(growth percentile) Weight(growth percentile) 103/70 (BP 1 Location: Right arm, BP Patient Position: Sitting) 86 97.4 °F (36.3 °C) (Oral) 20 5' 5\" (1.651 m) 174 lb 9.6 oz (79.2 kg) LMP SpO2 BMI OB Status Smoking Status 09/28/2016 100% 29.05 kg/m2 Hysterectomy Current Every Day Smoker BMI and BSA Data Body Mass Index Body Surface Area 29.05 kg/m 2 1.91 m 2 Preferred Pharmacy Pharmacy Name Phone [facility] [address], [address] [place] [address] [phone] Your Updated Medication List  
  
   
This list is accurate as of: 5/17/17  3:06 PM.  Always use your most recent med list.  
  
  
  
  
 Bess Peacocky 250-50 mcg/dose diskus inhaler Generic drug:  fluticasone-salmeterol INHALE ONE DOSE BY MOUTH TWICE DAILY  
  
 albuterol 90 mcg/actuation inhaler Commonly known as:  PROVENTIL HFA, VENTOLIN HFA, PROAIR HFA Take 2 Puffs by inhalation every four (4) hours as needed for Wheezing. butalbital-acetaminophen-caffeine -40 mg per tablet Commonly known as:  Waynetta Booze Take 1 Tab by mouth every six (6) hours as needed for Pain or Headache. docusate sodium 100 mg capsule Commonly known as:  [place] Angle Take 1 Cap by mouth two (2) times a day. KlonoPIN 0.5 mg tablet Generic drug:  clonazePAM  
Take  by mouth nightly as needed. LaMICtal 25 mg tablet Generic drug:  lamoTRIgine Take 50 mg by mouth nightly. methocarbamol 500 mg tablet Commonly known as:  ROBAXIN Take 1 Tab by mouth four (4) times daily as needed. nortriptyline 75 mg capsule Commonly known as:  PAMELOR Take 1 Cap by mouth nightly. ondansetron 4 mg disintegrating tablet Commonly known as:  ZOFRAN ODT Take 1 Tab by mouth every eight (8) hours as needed for Nausea. pantoprazole 40 mg tablet Commonly known as:  PROTONIX Take 1 Tab by mouth daily. simvastatin 20 mg tablet Commonly known as:  ZOCOR Take 1 Tab by mouth nightly. topiramate 200 mg tablet Commonly known as:  TOPAMAX TAKE ONE TABLET BY MOUTH TWICE DAILY  Indications: MIGRAINE PREVENTION  
  
 venlafaxine- mg capsule Commonly known as:  EFFEXOR-XR Take 150 mg by mouth daily. Prescriptions Sent to Pharmacy Refills  
 methocarbamol (ROBAXIN) 500 mg tablet 0 Sig: Take 1 Tab by mouth four (4) times daily as needed. Class: Normal  
 Pharmacy: [id number], [address] Ph #: [phone] Route: Oral  
  
Follow-up Instructions Return in about 2 months (around 7/17/2017) for SCHEDULE LAB APPOINTMENT FOR BEGINNING OF JULY AND FOLLOW UP AFTER, EOV. To-Do List   
 05/17/2017 Lab:  CBC W/O DIFF   
  
 05/18/2017 Lab:  LIPID PANEL   
  
 05/18/2017 Lab:  METABOLIC PANEL, COMPREHENSIVE Patient Instructions Neck Spasm: Exercises Your Care Instructions Here are some examples of typical rehabilitation exercises for your condition. Start each exercise slowly. Ease off the exercise if you start to have pain. Your doctor or physical therapist will tell you when you can start these exercises and which ones will work best for you. How to do the exercises Levator scapula stretch 1. Sit in a firm chair, or stand up straight. 2. Gently tilt your head toward your left shoulder. 3. Turn your head to look down into your armpit, bending your head slightly forward. Let the weight of your head stretch your neck muscles. 4. Hold for 15 to 30 seconds. 5. Return to your starting position.  
6. Follow the same instructions above, but tilt your head toward your right shoulder. 7. Repeat 2 to 4 times toward each shoulder. Upper trapezius stretch 1. Sit in a firm chair, or stand up straight. 2. This stretch works best if you keep your shoulder down as you lean away from it. To help you remember to do this, start by relaxing your shoulders and lightly holding on to your thighs or your chair. 3. Tilt your head toward your shoulder and hold for 15 to 30 seconds. Let the weight of your head stretch your muscles. 4. If you would like a little added stretch, place your arm behind your back. Use the arm opposite of the direction you are tilting your head. For example, if you are tilting your head to the left, place your right arm behind your back. 5. Repeat 2 to 4 times toward each shoulder. Neck rotation 1. Sit in a firm chair, or stand up straight. 2. Keeping your chin level, turn your head to the right, and hold for 15 to 30 seconds. 3. Turn your head to the left, and hold for 15 to 30 seconds. 4. Repeat 2 to 4 times to each side. Chin tuck 1. Lie on the floor with a rolled-up towel under your neck. Your head should be touching the floor. 2. Slowly bring your chin toward the front of your neck. 3. Hold for a count of 6, and then relax for up to 10 seconds. 4. Repeat 8 to 12 times. Forward neck flexion 1. Sit in a firm chair, or stand up straight. 2. Bend your head forward. 3. Hold for 15 to 30 seconds, then return to your starting position. 4. Repeat 2 to 4 times. Follow-up care is a key part of your treatment and safety. Be sure to make and go to all appointments, and call your doctor if you are having problems. It's also a good idea to know your test results and keep a list of the medicines you take. Where can you learn more? Go to http://romario-clover.info/. Enter P962 in the search box to learn more about \"Neck Spasm: Exercises. \" Current as of: May 23, 2016 Content Version: 11.2 © 1880-1697 Healthwise, Incorporated. Care instructions adapted under license by [employer] (which disclaims liability or warranty for this information). If you have questions about a medical condition or this instruction, always ask your healthcare professional. [address] any warranty or liability for your use of this information. Introducing [facility] & HEALTH SERVICES! Jeff Covarrubias introduces Hypejar patient portal. Now you can access parts of your medical record, email your doctor's office, and request medication refills online. 1. In your internet browser, go to [email or web address] 2. Click on the First Time User? Click Here link in the Sign In box. You will see the New Member Sign Up page. 3. Enter your Hypejar Access Code exactly as it appears below. You will not need to use this code after youve completed the sign-up process. If you do not sign up before the expiration date, you must request a new code. · Hypejar Access Code: [email or web address] Expires: 8/15/2017  1:52 PM 
 
4. Enter the last four digits of your Social Security Number (xxxx) and Date of Birth (mm/dd/yyyy) as indicated and click Submit. You will be taken to the next sign-up page. 5. Create a Hypejar ID. This will be your Hypejar login ID and cannot be changed, so think of one that is secure and easy to remember. 6. Create a Hypejar password. You can change your password at any time. 7. Enter your Password Reset Question and Answer. This can be used at a later time if you forget your password. 8. Enter your e-mail address. You will receive e-mail notification when new information is available in [address] 9. Click Sign Up. You can now view and download portions of your medical record. 10. Click the Download Summary menu link to download a portable copy of your medical information.  
 
If you have questions, please visit the Frequently Asked Questions section of the CleanFish. Remember, StartupDigesthart is NOT to be used for urgent needs. For medical emergencies, dial 911. Now available from your iPhone and Android! Please provide this summary of care documentation to your next provider. Your primary care clinician is listed as Mynor Gallo. If you have any questions after today's visit, please call [phone].

## 2017-05-17 NOTE — PROGRESS NOTES
Juli Dickerson is a 37 y.o. female who presents today for/with c/o muscle spasm and headache. 1. Have you been to the ER, urgent care clinic since your last visit? Hospitalized since your last visit? NO    2. Have you seen or consulted any other health care providers outside of the [address] [place] [address] since your last visit? Include any pap smears or colon screening.  NO    Health Maintenance reviewed  Yes    Health Maintenance Due   Topic Date Due    Pneumococcal 19-64 Medium Risk (1 of 1 - PPSV23) 08/26/1995    DTaP/Tdap/Td series (1 - Tdap) 11/17/2015

## 2017-05-18 DIAGNOSIS — G43.009 MIGRAINE WITHOUT AURA AND WITHOUT STATUS MIGRAINOSUS, NOT INTRACTABLE: ICD-10-CM

## 2017-05-18 DIAGNOSIS — F31.9 BIPOLAR DEPRESSION (HCC): ICD-10-CM

## 2017-05-18 DIAGNOSIS — E78.2 MIXED HYPERLIPIDEMIA: ICD-10-CM

## 2017-06-09 ENCOUNTER — OFFICE VISIT (OUTPATIENT)
Dept: OBGYN CLINIC | Age: 42
End: 2017-06-09

## 2017-06-09 ENCOUNTER — HOSPITAL ENCOUNTER (OUTPATIENT)
Dept: LAB | Age: 42
Discharge: HOME | End: 2017-06-09
Payer: MEDICAID

## 2017-06-09 LAB
ALBUMIN SERPL BCP-MCNC: 3.6 G/DL (ref 3.4–5)
ALBUMIN/GLOB SERPL: 1.1 {RATIO} (ref 0.8–1.7)
ALP SERPL-CCNC: 90 U/L (ref 45–117)
ALT SERPL-CCNC: 24 U/L (ref 13–56)
ANION GAP BLD CALC-SCNC: 9 MMOL/L (ref 3–18)
AST SERPL W P-5'-P-CCNC: 16 U/L (ref 15–37)
BILIRUB SERPL-MCNC: 0.3 MG/DL (ref 0.2–1)
BUN SERPL-MCNC: 11 MG/DL (ref 7–18)
BUN/CREAT SERPL: 13 (ref 12–20)
CALCIUM SERPL-MCNC: 8.3 MG/DL (ref 8.5–10.1)
CHLORIDE SERPL-SCNC: 109 MMOL/L (ref 100–108)
CHOLEST SERPL-MCNC: 171 MG/DL
CO2 SERPL-SCNC: 22 MMOL/L (ref 21–32)
CREAT SERPL-MCNC: 0.84 MG/DL (ref 0.6–1.3)
ERYTHROCYTE [DISTWIDTH] IN BLOOD BY AUTOMATED COUNT: 13.3 % (ref 11.6–14.5)
GLOBULIN SER CALC-MCNC: 3.4 G/DL (ref 2–4)
GLUCOSE SERPL-MCNC: 88 MG/DL (ref 74–99)
HCT VFR BLD AUTO: 43.4 % (ref 35–45)
HDLC SERPL-MCNC: 41 MG/DL (ref 40–60)
HDLC SERPL: 4.2 {RATIO} (ref 0–5)
HGB BLD-MCNC: 13.9 G/DL (ref 12–16)
LDLC SERPL CALC-MCNC: 109.2 MG/DL (ref 0–100)
LIPID PROFILE,FLP: ABNORMAL
MCH RBC QN AUTO: 28.9 PG (ref 24–34)
MCHC RBC AUTO-ENTMCNC: 32 G/DL (ref 31–37)
MCV RBC AUTO: 90.2 FL (ref 74–97)
PLATELET # BLD AUTO: 292 K/UL (ref 135–420)
PMV BLD AUTO: 10 FL (ref 9.2–11.8)
POTASSIUM SERPL-SCNC: 3.9 MMOL/L (ref 3.5–5.5)
PROT SERPL-MCNC: 7 G/DL (ref 6.4–8.2)
RBC # BLD AUTO: 4.81 M/UL (ref 4.2–5.3)
SODIUM SERPL-SCNC: 140 MMOL/L (ref 136–145)
TRIGL SERPL-MCNC: 104 MG/DL (ref ?–150)
VLDLC SERPL CALC-MCNC: 20.8 MG/DL
WBC # BLD AUTO: 9.8 K/UL (ref 4.6–13.2)

## 2017-06-09 PROCEDURE — 80061 LIPID PANEL: CPT | Performed by: NURSE PRACTITIONER

## 2017-06-09 PROCEDURE — 36415 COLL VENOUS BLD VENIPUNCTURE: CPT | Performed by: NURSE PRACTITIONER

## 2017-06-09 PROCEDURE — 80053 COMPREHEN METABOLIC PANEL: CPT | Performed by: NURSE PRACTITIONER

## 2017-06-09 PROCEDURE — 85027 COMPLETE CBC AUTOMATED: CPT | Performed by: NURSE PRACTITIONER

## 2017-06-23 ENCOUNTER — OFFICE VISIT (OUTPATIENT)
Dept: NEUROLOGY | Age: 42
End: 2017-06-23

## 2017-06-23 VITALS
HEART RATE: 78 BPM | OXYGEN SATURATION: 99 % | DIASTOLIC BLOOD PRESSURE: 60 MMHG | SYSTOLIC BLOOD PRESSURE: 96 MMHG | RESPIRATION RATE: 12 BRPM | HEIGHT: 65 IN | WEIGHT: 175.4 LBS | BODY MASS INDEX: 29.22 KG/M2 | TEMPERATURE: 98 F

## 2017-06-23 DIAGNOSIS — G43.009 MIGRAINE WITHOUT AURA AND WITHOUT STATUS MIGRAINOSUS, NOT INTRACTABLE: ICD-10-CM

## 2017-06-23 DIAGNOSIS — G43.909 MIGRAINE WITHOUT STATUS MIGRAINOSUS, NOT INTRACTABLE, UNSPECIFIED MIGRAINE TYPE: ICD-10-CM

## 2017-06-23 RX ORDER — TOPIRAMATE 200 MG/1
TABLET ORAL
Qty: 60 TAB | Refills: 6 | Status: SHIPPED | OUTPATIENT
Start: 2017-06-23 | End: 2019-06-17 | Stop reason: SDUPTHER

## 2017-06-23 RX ORDER — NORTRIPTYLINE HYDROCHLORIDE 75 MG/1
75 CAPSULE ORAL
Qty: 30 CAP | Refills: 6 | Status: SHIPPED | OUTPATIENT
Start: 2017-06-23 | End: 2018-01-29 | Stop reason: SDUPTHER

## 2017-06-23 NOTE — MR AVS SNAPSHOT
Visit Information Date & Time Provider Department Dept. Phone Encounter #  
 6/23/2017  1:40 PM Chuckie Lao MD [address] [phone] [id number] Follow-up Instructions Return in about 6 months (around 12/23/2017). Follow-up and Disposition History Your Appointments 7/21/2017 11:00 AM  
ANNUAL with Estefany Farias DO  
[facility] ([address] [place] [address]) Appt Note: annual; annual  
 [address] [phone]  
[phone]  
  
   
 [address] [phone]  
  
    
 7/21/2017  1:15 PM  
Office Visit with Kelsie Adkins NP [address] [address] [address] [place] [address]) Appt Note: Return in about 2 months (around 7/17/2017) for SCHEDULE LAB APPOINTMENT FOR BEGINNING OF JULY AND FOLLOW UP AFTER, EOV. [address] [facility] [phone]  
  
   
 [address]  
  
    
 12/27/2017 11:40 AM  
Follow Up with Chuckie Lao MD  
[address]) Appt Note: 6 month fu  
 [address] [facility] [address] [facility] [address] [phone]  
  
   
 [facility] [phone] Upcoming Health Maintenance Date Due Pneumococcal 19-64 Medium Risk (1 of 1 - PPSV23) 8/26/1995 DTaP/Tdap/Td series (1 - Tdap) 11/17/2015 INFLUENZA AGE 9 TO ADULT 8/1/2017 PAP AKA CERVICAL CYTOLOGY 7/1/2019 Allergies as of 6/23/2017  Review Complete On: 6/23/2017 By: Ramiro Pitt LPN Severity Noted Reaction Type Reactions Relpax [Eletriptan Hbr] High 02/25/2013    Anaphylaxis Relpax [Eletriptan Hbr]  04/24/2014    Sneezing Ultram [Tramadol]  02/25/2013    Nausea Only Ultram [Tramadol]  04/24/2014    Nausea and Vomiting Current Immunizations  Reviewed on 10/12/2016 Name Date Td, Adsorbed PF 11/16/2015  1:25 PM  
  
 Not reviewed this visit You Were Diagnosed With   
  
 Codes Comments Migraine without status migrainosus, not intractable, unspecified migraine type     ICD-10-CM: G43.909 ICD-9-CM: 346.90 Migraine without aura and without status migrainosus, not intractable     ICD-10-CM: G04.001 ICD-9-CM: 346.10 Vitals BP Pulse Temp Resp Height(growth percentile) Weight(growth percentile) 96/60 (BP 1 Location: Left arm, BP Patient Position: Sitting) 78 98 °F (36.7 °C) (Oral) 12 5' 5\" (1.651 m) 175 lb 6.4 oz (79.6 kg) LMP SpO2 BMI OB Status Smoking Status 09/28/2016 99% 29.19 kg/m2 Hysterectomy Current Every Day Smoker Vitals History BMI and BSA Data Body Mass Index Body Surface Area  
 29.19 kg/m 2 1.91 m 2 Preferred Pharmacy Pharmacy Name Phone [facility] [address], [address] [facility] [phone] Your Updated Medication List  
  
   
This list is accurate as of: 6/23/17  2:21 PM.  Always use your most recent med list.  
  
  
  
  
 Alamo Rideau 250-50 mcg/dose diskus inhaler Generic drug:  fluticasone-salmeterol INHALE ONE DOSE BY MOUTH TWICE DAILY  
  
 albuterol 90 mcg/actuation inhaler Commonly known as:  PROVENTIL HFA, VENTOLIN HFA, PROAIR HFA Take 2 Puffs by inhalation every four (4) hours as needed for Wheezing. butalbital-acetaminophen-caffeine -40 mg per tablet Commonly known as:  Franchesca Olive Take 1 Tab by mouth every six (6) hours as needed for Pain or Headache. docusate sodium 100 mg capsule Commonly known as:  Jill Moan Take 1 Cap by mouth two (2) times a day. KlonoPIN 0.5 mg tablet Generic drug:  clonazePAM  
Take  by mouth nightly as needed. LaMICtal 25 mg tablet Generic drug:  lamoTRIgine Take 50 mg by mouth nightly. LORTAB  PO Take  by mouth. methocarbamol 500 mg tablet Commonly known as:  ROBAXIN Take 1 Tab by mouth four (4) times daily as needed. nortriptyline 75 mg capsule Commonly known as:  PAMELOR Take 1 Cap by mouth nightly. ondansetron 4 mg disintegrating tablet Commonly known as:  ZOFRAN ODT Take 1 Tab by mouth every eight (8) hours as needed for Nausea. pantoprazole 40 mg tablet Commonly known as:  PROTONIX  
TAKE ONE TABLET BY MOUTH ONCE DAILY  
  
 simvastatin 20 mg tablet Commonly known as:  ZOCOR  
TAKE ONE TABLET BY MOUTH NIGHTLY  
  
 topiramate 200 mg tablet Commonly known as:  TOPAMAX TAKE ONE TABLET BY MOUTH TWICE DAILY  Indications: MIGRAINE PREVENTION  
  
 venlafaxine- mg capsule Commonly known as:  EFFEXOR-XR Take 150 mg by mouth daily. Prescriptions Sent to Pharmacy Refills  
 topiramate (TOPAMAX) 200 mg tablet 6 Sig: TAKE ONE TABLET BY MOUTH TWICE DAILY  Indications: MIGRAINE PREVENTION Class: Normal  
 Pharmacy: [id number] [address], [address] Ph #: [phone]  
 nortriptyline (PAMELOR) 75 mg capsule 6 Sig: Take 1 Cap by mouth nightly. Class: Normal  
 Pharmacy: [id number] [address], [address] Ph #: [phone] Route: Oral  
  
Follow-up Instructions Return in about 6 months (around 12/23/2017). Introducing [facility] & HEALTH SERVICES! Evangelist Carlisle introduces DigitalMR patient portal. Now you can access parts of your medical record, email your doctor's office, and request medication refills online. 1. In your internet browser, go to [email or web address] 2. Click on the First Time User? Click Here link in the Sign In box. You will see the New Member Sign Up page. 3. Enter your DigitalMR Access Code exactly as it appears below. You will not need to use this code after youve completed the sign-up process. If you do not sign up before the expiration date, you must request a new code. · DigitalMR Access Code: [email or web address] Expires: 8/15/2017  1:52 PM 
 
 4. Enter the last four digits of your Social Security Number (xxxx) and Date of Birth (mm/dd/yyyy) as indicated and click Submit. You will be taken to the next sign-up page. 5. Create a [place] ID. This will be your [place] [employer] [place] login ID and cannot be changed, so think of one that is secure and easy to remember. 6. Create a [place] password. You can change your password at any time. 7. Enter your Password Reset Question and Answer. This can be used at a later time if you forget your password. 8. Enter your e-mail address. You will receive e-mail notification when new information is available in [address] 9. Click Sign Up. You can now view and download portions of your medical record. 10. Click the Download Summary menu link to download a portable copy of your medical information. If you have questions, please visit the Frequently Asked Questions section of the [employer] website. Remember, [employer] is NOT to be used for urgent needs. For medical emergencies, dial 911. Now available from your iPhone and Android! Please provide this summary of care documentation to your next provider. Your primary care clinician is listed as Stephanie Conteh. If you have any questions after today's visit, please call [phone].
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
## 2017-07-12 ENCOUNTER — OFFICE VISIT (OUTPATIENT)
Dept: OBGYN CLINIC | Age: 42
End: 2017-07-12

## 2017-07-12 VITALS
SYSTOLIC BLOOD PRESSURE: 113 MMHG | HEIGHT: 65 IN | DIASTOLIC BLOOD PRESSURE: 73 MMHG | WEIGHT: 180 LBS | BODY MASS INDEX: 29.99 KG/M2 | HEART RATE: 97 BPM

## 2017-07-12 DIAGNOSIS — R31.9 HEMATURIA: ICD-10-CM

## 2017-07-12 DIAGNOSIS — R30.0 DYSURIA: ICD-10-CM

## 2017-07-12 DIAGNOSIS — K59.09 CHRONIC CONSTIPATION: ICD-10-CM

## 2017-07-12 DIAGNOSIS — N93.9 ABNORMAL VAGINAL BLEEDING: Primary | ICD-10-CM

## 2017-07-12 LAB
BILIRUB UR QL STRIP: NEGATIVE
GLUCOSE UR-MCNC: NEGATIVE MG/DL
KETONES P FAST UR STRIP-MCNC: NEGATIVE MG/DL
PH UR STRIP: 5.5 [PH] (ref 4.6–8)
PROT UR QL STRIP: NORMAL MG/DL
SP GR UR STRIP: 1 (ref 1–1.03)
UA UROBILINOGEN AMB POC: NORMAL (ref 0.2–1)
URINALYSIS CLARITY POC: NORMAL
URINALYSIS COLOR POC: YELLOW
URINE BLOOD POC: NORMAL
URINE LEUKOCYTES POC: NORMAL
URINE NITRITES POC: NEGATIVE

## 2017-07-12 RX ORDER — SULFAMETHOXAZOLE AND TRIMETHOPRIM 800; 160 MG/1; MG/1
1 TABLET ORAL 2 TIMES DAILY
Qty: 10 TAB | Refills: 0 | Status: SHIPPED | OUTPATIENT
Start: 2017-07-12 | End: 2017-07-17
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
## 2017-07-12 NOTE — PATIENT INSTRUCTIONS
Urinary Tract Infection in Women: Care Instructions  Your Care Instructions    A urinary tract infection, or UTI, is a general term for an infection anywhere between the kidneys and the urethra (where urine comes out). Most UTIs are bladder infections. They often cause pain or burning when you urinate. UTIs are caused by bacteria and can be cured with antibiotics. Be sure to complete your treatment so that the infection goes away. Follow-up care is a key part of your treatment and safety. Be sure to make and go to all appointments, and call your doctor if you are having problems. It's also a good idea to know your test results and keep a list of the medicines you take. How can you care for yourself at home? · Take your antibiotics as directed. Do not stop taking them just because you feel better. You need to take the full course of antibiotics. · Drink extra water and other fluids for the next day or two. This may help wash out the bacteria that are causing the infection. (If you have kidney, heart, or liver disease and have to limit fluids, talk with your doctor before you increase your fluid intake.)  · Avoid drinks that are carbonated or have caffeine. They can irritate the bladder. · Urinate often. Try to empty your bladder each time. · To relieve pain, take a hot bath or lay a heating pad set on low over your lower belly or genital area. Never go to sleep with a heating pad in place. To prevent UTIs  · Drink plenty of water each day. This helps you urinate often, which clears bacteria from your system. (If you have kidney, heart, or liver disease and have to limit fluids, talk with your doctor before you increase your fluid intake.)  · Urinate when you need to. · Urinate right after you have sex. · Change sanitary pads often. · Avoid douches, bubble baths, feminine hygiene sprays, and other feminine hygiene products that have deodorants.   · After going to the bathroom, wipe from front to back.  When should you call for help? Call your doctor now or seek immediate medical care if:  · Symptoms such as fever, chills, nausea, or vomiting get worse or appear for the first time. · You have new pain in your back just below your rib cage. This is called flank pain. · There is new blood or pus in your urine. · You have any problems with your antibiotic medicine. Watch closely for changes in your health, and be sure to contact your doctor if:  · You are not getting better after taking an antibiotic for 2 days. · Your symptoms go away but then come back. Where can you learn more? Go to http://romario-clover.info/. Enter S472 in the search box to learn more about \"Urinary Tract Infection in Women: Care Instructions. \"  Current as of: November 28, 2016  Content Version: 11.3  © 9613-4257 [employer]. Care instructions adapted under license by [employer] (which disclaims liability or warranty for this information). If you have questions about a medical condition or this instruction, always ask your healthcare professional. [address] any warranty or liability for your use of this information.

## 2017-07-21 ENCOUNTER — OFFICE VISIT (OUTPATIENT)
Dept: OBGYN CLINIC | Age: 42
End: 2017-07-21

## 2017-07-21 ENCOUNTER — OFFICE VISIT (OUTPATIENT)
Dept: FAMILY MEDICINE CLINIC | Age: 42
End: 2017-07-21

## 2017-07-21 VITALS
RESPIRATION RATE: 19 BRPM | HEART RATE: 69 BPM | HEIGHT: 65 IN | OXYGEN SATURATION: 98 % | BODY MASS INDEX: 30.09 KG/M2 | TEMPERATURE: 97.5 F | WEIGHT: 180.6 LBS | SYSTOLIC BLOOD PRESSURE: 107 MMHG | DIASTOLIC BLOOD PRESSURE: 68 MMHG

## 2017-07-21 VITALS — WEIGHT: 180 LBS | BODY MASS INDEX: 29.99 KG/M2 | HEIGHT: 65 IN

## 2017-07-21 DIAGNOSIS — E78.2 MIXED HYPERLIPIDEMIA: Primary | ICD-10-CM

## 2017-07-21 DIAGNOSIS — Z23 ENCOUNTER FOR IMMUNIZATION: ICD-10-CM

## 2017-07-21 DIAGNOSIS — Z12.39 BREAST CANCER SCREENING: ICD-10-CM

## 2017-07-21 DIAGNOSIS — E83.51 HYPOCALCEMIA: ICD-10-CM

## 2017-07-21 DIAGNOSIS — G89.4 CHRONIC PAIN SYNDROME: ICD-10-CM

## 2017-07-21 DIAGNOSIS — Z01.419 WELL WOMAN EXAM WITH ROUTINE GYNECOLOGICAL EXAM: Primary | ICD-10-CM

## 2017-07-21 DIAGNOSIS — F17.210 CIGARETTE SMOKER: ICD-10-CM

## 2017-07-21 RX ORDER — CALCIUM CARBONATE 500(1250)
1 TABLET ORAL DAILY
Qty: 30 TAB | Refills: 5 | Status: SHIPPED | OUTPATIENT
Start: 2017-07-21 | End: 2018-01-26 | Stop reason: SDUPTHER

## 2017-07-21 NOTE — PATIENT INSTRUCTIONS

## 2017-07-21 NOTE — PROGRESS NOTES
SUBJECTIVE:  Ria Harper is a 37 y.o. female who presents today for lab review. 1. Have you been to the ER, urgent care clinic since your last visit? Hospitalized since your last visit? No    2. Have you seen or consulted any other health care providers outside of the [address] [place] [address] since your last visit? Include any pap smears or colon screening.  No    Health Maintenance reviewed  Yes    Health Maintenance Due   Topic Date Due    Pneumococcal 19-64 Medium Risk (1 of 1 - PPSV23) 08/26/1995    DTaP/Tdap/Td series (1 - Tdap) 11/17/2015

## 2017-07-21 NOTE — MR AVS SNAPSHOT
Visit Information Date & Time Provider Department Dept. Phone Encounter #  
 7/21/2017  1:15 PM Taocs Singh, [address] [id number] Follow-up Instructions Return in about 6 months (around 1/21/2018) for routine follow up cholesterol, review lab work. Your Appointments 12/27/2017 11:40 AM  
Follow Up with Farzaneh Zuñiga MD  
[facility] [address]) Appt Note: 6 month fu  
 [address] [facility] [address] [facility] [address] [phone]  
  
   
 [facility] [address] Upcoming Health Maintenance Date Due Pneumococcal 19-64 Medium Risk (1 of 1 - PPSV23) 8/26/1995 INFLUENZA AGE 9 TO ADULT 8/1/2017 PAP AKA CERVICAL CYTOLOGY 7/1/2019 DTaP/Tdap/Td series (2 - Td) 7/21/2027 Allergies as of 7/21/2017  Review Complete On: 7/21/2017 By: Tacos Singh NP Severity Noted Reaction Type Reactions Relpax [Eletriptan Hbr] High 02/25/2013    Anaphylaxis Relpax [Eletriptan Hbr]  04/24/2014    Sneezing Ultram [Tramadol]  02/25/2013    Nausea Only Ultram [Tramadol]  04/24/2014    Nausea and Vomiting Current Immunizations  Reviewed on 10/12/2016 Name Date Pneumococcal Polysaccharide (PPSV-23)  Incomplete Td, Adsorbed PF 11/16/2015  1:25 PM  
  
 Not reviewed this visit You Were Diagnosed With   
  
 Codes Comments Mixed hyperlipidemia    -  Primary ICD-10-CM: V01.9 ICD-9-CM: 272.2 Hypocalcemia     ICD-10-CM: E83.51 
ICD-9-CM: 275.41 Chronic pain syndrome     ICD-10-CM: G89.4 ICD-9-CM: 338.4 Cigarette smoker     ICD-10-CM: F17.210 ICD-9-CM: 305.1 Encounter for immunization     ICD-10-CM: A62 ICD-9-CM: V03.89 Vitals BP Pulse Temp Resp Height(growth percentile) Weight(growth percentile)  107/68 (BP 1 Location: Right arm, BP Patient Position: Sitting) 69 97.5 °F (36.4 °C) (Oral) 19 5' 5\" (1.651 m) 180 lb 9.6 oz (81.9 kg) LMP SpO2 BMI OB Status Smoking Status 09/28/2016 98% 30.05 kg/m2 Hysterectomy Current Every Day Smoker BMI and BSA Data Body Mass Index Body Surface Area 30.05 kg/m 2 1.94 m 2 Preferred Pharmacy Pharmacy Name Phone [facility] [address], [place] [phone] Your Updated Medication List  
  
   
This list is accurate as of: 7/21/17  1:39 PM.  Always use your most recent med list.  
  
  
  
  
 [place] Silence 250-50 mcg/dose diskus inhaler Generic drug:  fluticasone-salmeterol INHALE ONE PUFF BY MOUTH TWICE DAILY  
  
 albuterol 90 mcg/actuation inhaler Commonly known as:  PROVENTIL HFA, VENTOLIN HFA, PROAIR HFA Take 2 Puffs by inhalation every four (4) hours as needed for Wheezing. butalbital-acetaminophen-caffeine -40 mg per tablet Commonly known as:  Gurshy Fryer Take 1 Tab by mouth every six (6) hours as needed for Pain or Headache.  
  
 calcium carbonate 500 mg calcium (1,250 mg) tablet Commonly known as:  CALCIUM 500 Take 1 Tab by mouth daily. docusate sodium 100 mg capsule Commonly known as:  [place] Pian Take 1 Cap by mouth two (2) times a day. KlonoPIN 0.5 mg tablet Generic drug:  clonazePAM  
Take  by mouth nightly as needed. LaMICtal 25 mg tablet Generic drug:  lamoTRIgine Take 50 mg by mouth nightly. LORTAB  PO Take  by mouth. methocarbamol 500 mg tablet Commonly known as:  ROBAXIN Take 1 Tab by mouth four (4) times daily as needed. nortriptyline 75 mg capsule Commonly known as:  PAMELOR Take 1 Cap by mouth nightly. ondansetron 4 mg disintegrating tablet Commonly known as:  ZOFRAN ODT Take 1 Tab by mouth every eight (8) hours as needed for Nausea. pantoprazole 40 mg tablet Commonly known as:  PROTONIX  
TAKE ONE TABLET BY MOUTH ONCE DAILY simvastatin 20 mg tablet Commonly known as:  ZOCOR  
TAKE ONE TABLET BY MOUTH NIGHTLY  
  
 topiramate 200 mg tablet Commonly known as:  TOPAMAX TAKE ONE TABLET BY MOUTH TWICE DAILY  Indications: MIGRAINE PREVENTION  
  
 venlafaxine- mg capsule Commonly known as:  EFFEXOR-XR Take 150 mg by mouth daily. Prescriptions Sent to Pharmacy Refills  
 calcium carbonate (CALCIUM 500) 500 mg calcium (1,250 mg) tablet 5 Sig: Take 1 Tab by mouth daily. Class: Normal  
 Pharmacy: [address], [address] Ph #: [phone] Route: Oral  
  
We Performed the Following PNEUMOCOCCAL POLYSACCHARIDE VACCINE, 23-VALENT, ADULT OR IMMUNOSUPPRESSED PT DOSE, [94455 CPT(R)] NH SMOKING AND TOBACCO USE CESSATION 3 - 10 MINUTES [80559 CPT(R)] Follow-up Instructions Return in about 6 months (around 1/21/2018) for routine follow up cholesterol, review lab work. To-Do List   
 01/08/2018 Lab:  LIPID PANEL   
  
 01/08/2018 Lab:  METABOLIC PANEL, COMPREHENSIVE Patient Instructions Learning About Benefits From Quitting Smoking How does quitting smoking make you healthier? If you're thinking about quitting smoking, you may have a few reasons to be smoke-free. Your health may be one of them. · When you quit smoking, you lower your risks for cancer, lung disease, heart attack, stroke, blood vessel disease, and blindness from macular degeneration. · When you're smoke-free, you get sick less often, and you heal faster. You are less likely to get colds, flu, bronchitis, and pneumonia. · As a nonsmoker, you may find that your mood is better and you are less stressed. When and how will you feel healthier? Quitting has real health benefits that start from day 1 of being smoke-free. And the longer you stay smoke-free, the healthier you get and the better you feel. The first hours · After just 20 minutes, your blood pressure and heart rate go down. That means there's less stress on your heart and blood vessels. · Within 12 hours, the level of carbon monoxide in your blood drops back to normal. That makes room for more oxygen. With more oxygen in your body, you may notice that you have more energy than when you smoked. After 2 weeks · Your lungs start to work better. · Your risk of heart attack starts to drop. After 1 month · When your lungs are clear, you cough less and breathe deeper, so it's easier to be active. · Your sense of taste and smell return. That means you can enjoy food more than you have since you started smoking. Over the years · After 1 year, your risk of heart disease is half what it would be if you kept smoking. · After 5 years, your risk of stroke starts to shrink. Within a few years after that, it's about the same as if you'd never smoked. · After 10 years, your risk of dying from lung cancer is cut by about half. And your risk for many other types of cancer is lower too. How would quitting help others in your life? When you quit smoking, you improve the health of everyone who now breathes in your smoke. · Their heart, lung, and cancer risks drop, much like yours. · They are sick less. For babies and small children, living smoke-free means they're less likely to have ear infections, pneumonia, and bronchitis. · If you're a woman who is or will be pregnant someday, quitting smoking means a healthier . · Children who are close to you are less likely to become adult smokers. Where can you learn more? Go to http://romario-clover.info/. Enter [id number] [id number] [phone] in the search box to learn more about \"Learning About Benefits From Quitting Smoking. \" Current as of: 2017 Content Version: 11.3 © 8632-0477 [employer].  Care instructions adapted under license by [employer] (which disclaims liability or warranty for this information). If you have questions about a medical condition or this instruction, always ask your healthcare professional. [address] any warranty or liability for your use of this information. Introducing [facility] SERVICES! Bekah Berman introduces [employer] patient portal. Now you can access parts of your medical record, email your doctor's office, and request medication refills online. 1. In your internet browser, go to [email or web address]. [employer] 2. Click on the First Time User? Click Here link in the Sign In box. You will see the New Member Sign Up page. 3. Enter your [employer] Access Code exactly as it appears below. You will not need to use this code after youve completed the sign-up process. If you do not sign up before the expiration date, you must request a new code. · [employer] Access Code: [email or web address] Expires: 8/15/2017  1:52 PM 
 
4. Enter the last four digits of your Social Security Number (xxxx) and Date of Birth (mm/dd/yyyy) as indicated and click Submit. You will be taken to the next sign-up page. 5. Create a [employer] ID. This will be your [employer] login ID and cannot be changed, so think of one that is secure and easy to remember. 6. Create a [employer] password. You can change your password at any time. 7. Enter your Password Reset Question and Answer. This can be used at a later time if you forget your password. 8. Enter your e-mail address. You will receive e-mail notification when new information is available in [address] 9. Click Sign Up. You can now view and download portions of your medical record. 10. Click the Download Summary menu link to download a portable copy of your medical information. If you have questions, please visit the Frequently Asked Questions section of the [employer] website. Remember, [employer] is NOT to be used for urgent needs. For medical emergencies, dial 911. Now available from your iPhone and Android! Please provide this summary of care documentation to your next provider. Your primary care clinician is listed as Marie Garcia. If you have any questions after today's visit, please call [phone].

## 2017-07-21 NOTE — PATIENT INSTRUCTIONS
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
## 2017-08-01 ENCOUNTER — HOSPITAL ENCOUNTER (EMERGENCY)
Age: 42
Discharge: HOME | End: 2017-08-01
Attending: EMERGENCY MEDICINE | Admitting: EMERGENCY MEDICINE
Payer: MEDICAID

## 2017-08-01 ENCOUNTER — APPOINTMENT (OUTPATIENT)
Dept: CT IMAGING | Age: 42
End: 2017-08-01
Attending: PHYSICIAN ASSISTANT
Payer: MEDICAID

## 2017-08-01 VITALS
DIASTOLIC BLOOD PRESSURE: 70 MMHG | HEART RATE: 92 BPM | OXYGEN SATURATION: 100 % | RESPIRATION RATE: 18 BRPM | TEMPERATURE: 98.8 F | SYSTOLIC BLOOD PRESSURE: 109 MMHG | HEIGHT: 65 IN | WEIGHT: 180 LBS | BODY MASS INDEX: 29.99 KG/M2

## 2017-08-01 DIAGNOSIS — R42 DIZZINESS: ICD-10-CM

## 2017-08-01 DIAGNOSIS — R11.2 NON-INTRACTABLE VOMITING WITH NAUSEA, UNSPECIFIED VOMITING TYPE: Primary | ICD-10-CM

## 2017-08-01 DIAGNOSIS — R10.84 ABDOMINAL PAIN, GENERALIZED: ICD-10-CM

## 2017-08-01 DIAGNOSIS — S09.90XA HEAD INJURY, INITIAL ENCOUNTER: ICD-10-CM
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
## 2017-08-01 LAB
ALBUMIN SERPL BCP-MCNC: 3.5 G/DL (ref 3.4–5)
ALBUMIN/GLOB SERPL: 1 {RATIO} (ref 0.8–1.7)
ALP SERPL-CCNC: 83 U/L (ref 45–117)
ALT SERPL-CCNC: 29 U/L (ref 13–56)
ANION GAP BLD CALC-SCNC: 9 MMOL/L (ref 3–18)
APPEARANCE UR: NORMAL
AST SERPL W P-5'-P-CCNC: 18 U/L (ref 15–37)
BASOPHILS # BLD AUTO: 0.1 K/UL (ref 0–0.06)
BASOPHILS # BLD: 0 % (ref 0–2)
BILIRUB SERPL-MCNC: 0.2 MG/DL (ref 0.2–1)
BILIRUB UR QL: NEGATIVE
BUN SERPL-MCNC: 9 MG/DL (ref 7–18)
BUN/CREAT SERPL: 11 (ref 12–20)
CALCIUM SERPL-MCNC: 8.1 MG/DL (ref 8.5–10.1)
CHLORIDE SERPL-SCNC: 111 MMOL/L (ref 100–108)
CK MB CFR SERPL CALC: NORMAL % (ref 0–4)
CK MB SERPL-MCNC: <1 NG/ML (ref 5–25)
CK SERPL-CCNC: 75 U/L (ref 26–192)
CO2 SERPL-SCNC: 23 MMOL/L (ref 21–32)
COLOR UR: YELLOW
CREAT SERPL-MCNC: 0.8 MG/DL (ref 0.6–1.3)
DIFFERENTIAL METHOD BLD: ABNORMAL
EOSINOPHIL # BLD: 0.3 K/UL (ref 0–0.4)
EOSINOPHIL NFR BLD: 2 % (ref 0–5)
ERYTHROCYTE [DISTWIDTH] IN BLOOD BY AUTOMATED COUNT: 13.6 % (ref 11.6–14.5)
GLOBULIN SER CALC-MCNC: 3.5 G/DL (ref 2–4)
GLUCOSE SERPL-MCNC: 93 MG/DL (ref 74–99)
GLUCOSE UR STRIP.AUTO-MCNC: NEGATIVE MG/DL
HCT VFR BLD AUTO: 40.1 % (ref 35–45)
HGB BLD-MCNC: 12.6 G/DL (ref 12–16)
HGB UR QL STRIP: NEGATIVE
KETONES UR QL STRIP.AUTO: NEGATIVE MG/DL
LEUKOCYTE ESTERASE UR QL STRIP.AUTO: NEGATIVE
LIPASE SERPL-CCNC: 234 U/L (ref 73–393)
LYMPHOCYTES # BLD AUTO: 29 % (ref 21–52)
LYMPHOCYTES # BLD: 4.2 K/UL (ref 0.9–3.6)
MAGNESIUM SERPL-MCNC: 2.2 MG/DL (ref 1.6–2.6)
MCH RBC QN AUTO: 28.7 PG (ref 24–34)
MCHC RBC AUTO-ENTMCNC: 31.4 G/DL (ref 31–37)
MCV RBC AUTO: 91.3 FL (ref 74–97)
MONOCYTES # BLD: 1 K/UL (ref 0.05–1.2)
MONOCYTES NFR BLD AUTO: 7 % (ref 3–10)
NEUTS SEG # BLD: 9.1 K/UL (ref 1.8–8)
NEUTS SEG NFR BLD AUTO: 62 % (ref 40–73)
NITRITE UR QL STRIP.AUTO: NEGATIVE
PH UR STRIP: 7.5 [PH] (ref 5–8)
PLATELET # BLD AUTO: 258 K/UL (ref 135–420)
PMV BLD AUTO: 8.9 FL (ref 9.2–11.8)
POTASSIUM SERPL-SCNC: 3.5 MMOL/L (ref 3.5–5.5)
PROT SERPL-MCNC: 7 G/DL (ref 6.4–8.2)
PROT UR STRIP-MCNC: NEGATIVE MG/DL
RBC # BLD AUTO: 4.39 M/UL (ref 4.2–5.3)
SODIUM SERPL-SCNC: 143 MMOL/L (ref 136–145)
SP GR UR REFRACTOMETRY: 1.02 (ref 1–1.03)
TROPONIN I SERPL-MCNC: <0.02 NG/ML (ref 0–0.04)
UROBILINOGEN UR QL STRIP.AUTO: 0.2 EU/DL (ref 0.2–1)
WBC # BLD AUTO: 14.6 K/UL (ref 4.6–13.2)

## 2017-08-01 PROCEDURE — 80053 COMPREHEN METABOLIC PANEL: CPT

## 2017-08-01 PROCEDURE — 74011250636 HC RX REV CODE- 250/636: Performed by: PHYSICIAN ASSISTANT

## 2017-08-01 PROCEDURE — 70450 CT HEAD/BRAIN W/O DYE: CPT

## 2017-08-01 PROCEDURE — 99283 EMERGENCY DEPT VISIT LOW MDM: CPT

## 2017-08-01 PROCEDURE — 81003 URINALYSIS AUTO W/O SCOPE: CPT

## 2017-08-01 PROCEDURE — 93005 ELECTROCARDIOGRAM TRACING: CPT

## 2017-08-01 PROCEDURE — 96375 TX/PRO/DX INJ NEW DRUG ADDON: CPT

## 2017-08-01 PROCEDURE — 85025 COMPLETE CBC W/AUTO DIFF WBC: CPT

## 2017-08-01 PROCEDURE — 83735 ASSAY OF MAGNESIUM: CPT

## 2017-08-01 PROCEDURE — 83690 ASSAY OF LIPASE: CPT

## 2017-08-01 PROCEDURE — 82550 ASSAY OF CK (CPK): CPT

## 2017-08-01 PROCEDURE — 96374 THER/PROPH/DIAG INJ IV PUSH: CPT

## 2017-08-01 PROCEDURE — 96361 HYDRATE IV INFUSION ADD-ON: CPT

## 2017-08-01 RX ORDER — ONDANSETRON 2 MG/ML
4 INJECTION INTRAMUSCULAR; INTRAVENOUS
Status: COMPLETED | OUTPATIENT
Start: 2017-08-01 | End: 2017-08-01

## 2017-08-01 RX ORDER — KETOROLAC TROMETHAMINE 30 MG/ML
30 INJECTION, SOLUTION INTRAMUSCULAR; INTRAVENOUS
Status: COMPLETED | OUTPATIENT
Start: 2017-08-01 | End: 2017-08-01

## 2017-08-01 RX ORDER — ONDANSETRON 4 MG/1
4 TABLET, ORALLY DISINTEGRATING ORAL
Qty: 15 TAB | Refills: 0 | Status: SHIPPED | OUTPATIENT
Start: 2017-08-01 | End: 2017-08-08 | Stop reason: SDUPTHER

## 2017-08-01 RX ADMIN — ONDANSETRON 4 MG: 2 INJECTION INTRAMUSCULAR; INTRAVENOUS at 22:20

## 2017-08-01 RX ADMIN — SODIUM CHLORIDE 1000 ML: 900 INJECTION, SOLUTION INTRAVENOUS at 22:20

## 2017-08-01 RX ADMIN — KETOROLAC TROMETHAMINE 30 MG: 30 INJECTION, SOLUTION INTRAMUSCULAR at 22:20

## 2017-08-02 ENCOUNTER — OFFICE VISIT (OUTPATIENT)
Dept: FAMILY MEDICINE CLINIC | Age: 42
End: 2017-08-02

## 2017-08-02 VITALS
BODY MASS INDEX: 32.26 KG/M2 | WEIGHT: 193.6 LBS | RESPIRATION RATE: 18 BRPM | HEART RATE: 88 BPM | OXYGEN SATURATION: 99 % | SYSTOLIC BLOOD PRESSURE: 106 MMHG | HEIGHT: 65 IN | TEMPERATURE: 98.3 F | DIASTOLIC BLOOD PRESSURE: 58 MMHG

## 2017-08-02 DIAGNOSIS — S09.90XA HEAD INJURY, INITIAL ENCOUNTER: Primary | ICD-10-CM

## 2017-08-02 LAB
ATRIAL RATE: 82 BPM
CALCULATED P AXIS, ECG09: 40 DEGREES
CALCULATED R AXIS, ECG10: 49 DEGREES
CALCULATED T AXIS, ECG11: 43 DEGREES
DIAGNOSIS, 93000: NORMAL
P-R INTERVAL, ECG05: 150 MS
Q-T INTERVAL, ECG07: 400 MS
QRS DURATION, ECG06: 86 MS
QTC CALCULATION (BEZET), ECG08: 467 MS
VENTRICULAR RATE, ECG03: 82 BPM

## 2017-08-02 NOTE — ED TRIAGE NOTES
Pt states she started vomiting yesterday, yesterday evening became very dizzy and fell, hitting her head on the bathtub. Denies LOC.  Dizzy all day today with N/V.
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
## 2017-08-02 NOTE — DISCHARGE INSTRUCTIONS
Abdominal Pain: Care Instructions  Your Care Instructions    Abdominal pain has many possible causes. Some aren't serious and get better on their own in a few days. Others need more testing and treatment. If your pain continues or gets worse, you need to be rechecked and may need more tests to find out what is wrong. You may need surgery to correct the problem. Don't ignore new symptoms, such as fever, nausea and vomiting, urination problems, pain that gets worse, and dizziness. These may be signs of a more serious problem. Your doctor may have recommended a follow-up visit in the next 8 to 12 hours. If you are not getting better, you may need more tests or treatment. The doctor has checked you carefully, but problems can develop later. If you notice any problems or new symptoms, get medical treatment right away. Follow-up care is a key part of your treatment and safety. Be sure to make and go to all appointments, and call your doctor if you are having problems. It's also a good idea to know your test results and keep a list of the medicines you take. How can you care for yourself at home? · Rest until you feel better. · To prevent dehydration, drink plenty of fluids, enough so that your urine is light yellow or clear like water. Choose water and other caffeine-free clear liquids until you feel better. If you have kidney, heart, or liver disease and have to limit fluids, talk with your doctor before you increase the amount of fluids you drink. · If your stomach is upset, eat mild foods, such as rice, dry toast or crackers, bananas, and applesauce. Try eating several small meals instead of two or three large ones. · Wait until 48 hours after all symptoms have gone away before you have spicy foods, alcohol, and drinks that contain caffeine. · Do not eat foods that are high in fat. · Avoid anti-inflammatory medicines such as aspirin, ibuprofen (Advil, Motrin), and naproxen (Aleve).  These can cause stomach upset. Talk to your doctor if you take daily aspirin for another health problem. When should you call for help? Call 911 anytime you think you may need emergency care. For example, call if:  · You passed out (lost consciousness). · You pass maroon or very bloody stools. · You vomit blood or what looks like coffee grounds. · You have new, severe belly pain. Call your doctor now or seek immediate medical care if:  · Your pain gets worse, especially if it becomes focused in one area of your belly. · You have a new or higher fever. · Your stools are black and look like tar, or they have streaks of blood. · You have unexpected vaginal bleeding. · You have symptoms of a urinary tract infection. These may include:  ¨ Pain when you urinate. ¨ Urinating more often than usual.  ¨ Blood in your urine. · You are dizzy or lightheaded, or you feel like you may faint. Watch closely for changes in your health, and be sure to contact your doctor if:  · You are not getting better after 1 day (24 hours). Where can you learn more? Go to http://romarioJustFamilyclover.info/. Enter W877 in the search box to learn more about \"Abdominal Pain: Care Instructions. \"  Current as of: March 20, 2017  Content Version: 11.3  © 5150-1236 [employer]. Care instructions adapted under license by [employer] (which disclaims liability or warranty for this information). If you have questions about a medical condition or this instruction, always ask your healthcare professional. [place] [address] any warranty or liability for your use of this information. Dizziness: Care Instructions  Your Care Instructions  Dizziness is the feeling of unsteadiness or fuzziness in your head. It is different than having vertigo, which is a feeling that the room is spinning or that you are moving or falling.  It is also different from lightheadedness, which is the feeling that you are about to faint. It can be hard to know what causes dizziness. Some people feel dizzy when they have migraine headaches. Sometimes bouts of flu can make you feel dizzy. Some medical conditions, such as heart problems or high blood pressure, can make you feel dizzy. Many medicines can cause dizziness, including medicines for high blood pressure, pain, or anxiety. If a medicine causes your symptoms, your doctor may recommend that you stop or change the medicine. If it is a problem with your heart, you may need medicine to help your heart work better. If there is no clear reason for your symptoms, your doctor may suggest watching and waiting for a while to see if the dizziness goes away on its own. Follow-up care is a key part of your treatment and safety. Be sure to make and go to all appointments, and call your doctor if you are having problems. It's also a good idea to know your test results and keep a list of the medicines you take. How can you care for yourself at home? · If your doctor recommends or prescribes medicine, take it exactly as directed. Call your doctor if you think you are having a problem with your medicine. · Do not drive while you feel dizzy. · Try to prevent falls. Steps you can take include:  ¨ Using nonskid mats, adding grab bars near the tub, and using night-lights. ¨ Clearing your home so that walkways are free of anything you might trip on. ¨ Letting family and friends know that you have been feeling dizzy. This will help them know how to help you. When should you call for help? Call 911 anytime you think you may need emergency care. For example, call if:  · You passed out (lost consciousness). · You have dizziness along with symptoms of a heart attack. These may include:  ¨ Chest pain or pressure, or a strange feeling in the chest.  ¨ Sweating. ¨ Shortness of breath. ¨ Nausea or vomiting.   ¨ Pain, pressure, or a strange feeling in the back, neck, jaw, or upper belly or in one or both shoulders or arms. ¨ Lightheadedness or sudden weakness. ¨ A fast or irregular heartbeat. · You have symptoms of a stroke. These may include:  ¨ Sudden numbness, tingling, weakness, or loss of movement in your face, arm, or leg, especially on only one side of your body. ¨ Sudden vision changes. ¨ Sudden trouble speaking. ¨ Sudden confusion or trouble understanding simple statements. ¨ Sudden problems with walking or balance. ¨ A sudden, severe headache that is different from past headaches. Call your doctor now or seek immediate medical care if:  · You feel dizzy and have a fever, headache, or ringing in your ears. · You have new or increased nausea and vomiting. · Your dizziness does not go away or comes back. Watch closely for changes in your health, and be sure to contact your doctor if:  · You do not get better as expected. Where can you learn more? Go to http://romario-clover.info/. Enter Z608 in the search box to learn more about \"Dizziness: Care Instructions. \"  Current as of: March 20, 2017  Content Version: 11.3  © 7184-4477 [employer]. Care instructions adapted under license by [employer] (which disclaims liability or warranty for this information). If you have questions about a medical condition or this instruction, always ask your healthcare professional. [place] [address] any warranty or liability for your use of this information.

## 2017-08-02 NOTE — MR AVS SNAPSHOT
Visit Information Date & Time Provider Department Dept. Phone Encounter #  
 8/2/2017  2:00 PM Doug Lanza [phone] [id number] Follow-up Instructions Return in about 1 week (around 8/9/2017) for rov with dixie. Your Appointments 8/8/2017  8:15 AM  
[place] [address] [address]) Appt Note: Pt late R/S  
 [address] [facility] [address] [place]  
  
   
 [address] [place] [address] [address]  
  
    
 12/27/2017 11:40 AM  
Follow Up with Ina Rojas MD  
[address]) Appt Note: 6 month fu  
 [address] [address]  
[phone]  
  
   
 [place] [address]  
  
    
 1/19/2018 11:00 AM  
ROUTINE CARE with Nehemiah Smith NP [address] [address]) Appt Note: return in 6 months for a f/u for cholesterol [address] [facility] [place]  
  
   
 [address] Upcoming Health Maintenance Date Due INFLUENZA AGE 9 TO ADULT 8/1/2017 PAP AKA CERVICAL CYTOLOGY 7/1/2019 DTaP/Tdap/Td series (2 - Td) 7/21/2027 Allergies as of 8/2/2017  Review Complete On: 8/1/2017 By: Tara Parham RN Severity Noted Reaction Type Reactions Relpax [Eletriptan Hbr] High 02/25/2013    Anaphylaxis Relpax [Eletriptan Hbr]  04/24/2014    Sneezing Ultram [Tramadol]  02/25/2013    Nausea Only Ultram [Tramadol]  04/24/2014    Nausea and Vomiting Current Immunizations  Reviewed on 10/12/2016 Name Date Pneumococcal Polysaccharide (PPSV-23) 7/21/2017 Td, Adsorbed PF 11/16/2015  1:25 PM  
  
 Not reviewed this visit You Were Diagnosed With   
  
 Codes Comments Head injury, initial encounter    -  Primary ICD-10-CM: S09. 90XA ICD-9-CM: 959.01 Vitals BP Pulse Temp Resp Height(growth percentile) Weight(growth percentile) 106/58 (BP 1 Location: Left arm, BP Patient Position: Sitting) 88 98.3 °F (36.8 °C) (Oral) 18 5' 5\" (1.651 m) 193 lb 9.6 oz (87.8 kg) LMP SpO2 BMI OB Status Smoking Status 09/28/2016 99% 32.22 kg/m2 Hysterectomy Current Every Day Smoker BMI and BSA Data Body Mass Index Body Surface Area  
 32.22 kg/m 2 2.01 m 2 Preferred Pharmacy Pharmacy Name Phone [facility] [address], [address] [facility] [phone] Your Updated Medication List  
  
   
This list is accurate as of: 8/2/17  3:23 PM.  Always use your most recent med list.  
  
  
  
  
 Keith Chime 250-50 mcg/dose diskus inhaler Generic drug:  fluticasone-salmeterol INHALE ONE PUFF BY MOUTH TWICE DAILY  
  
 albuterol 90 mcg/actuation inhaler Commonly known as:  PROVENTIL HFA, VENTOLIN HFA, PROAIR HFA Take 2 Puffs by inhalation every four (4) hours as needed for Wheezing. butalbital-acetaminophen-caffeine -40 mg per tablet Commonly known as:  Halima Gerold Take 1 Tab by mouth every six (6) hours as needed for Pain or Headache.  
  
 calcium carbonate 500 mg calcium (1,250 mg) tablet Commonly known as:  CALCIUM 500 Take 1 Tab by mouth daily. docusate sodium 100 mg capsule Commonly known as:  Curly Mars Take 1 Cap by mouth two (2) times a day. KlonoPIN 0.5 mg tablet Generic drug:  clonazePAM  
Take  by mouth nightly as needed. LaMICtal 25 mg tablet Generic drug:  lamoTRIgine Take 50 mg by mouth nightly. LORTAB  PO Take  by mouth. methocarbamol 500 mg tablet Commonly known as:  ROBAXIN Take 1 Tab by mouth four (4) times daily as needed. nortriptyline 75 mg capsule Commonly known as:  PAMELOR Take 1 Cap by mouth nightly. ondansetron 4 mg disintegrating tablet Commonly known as:  ZOFRAN ODT  
 Take 1 Tab by mouth every eight (8) hours as needed for Nausea. pantoprazole 40 mg tablet Commonly known as:  PROTONIX  
TAKE ONE TABLET BY MOUTH ONCE DAILY  
  
 simvastatin 20 mg tablet Commonly known as:  ZOCOR  
TAKE ONE TABLET BY MOUTH NIGHTLY  
  
 topiramate 200 mg tablet Commonly known as:  TOPAMAX TAKE ONE TABLET BY MOUTH TWICE DAILY  Indications: MIGRAINE PREVENTION  
  
 venlafaxine- mg capsule Commonly known as:  EFFEXOR-XR Take 150 mg by mouth daily. Follow-up Instructions Return in about 1 week (around 8/9/2017) for rov with dixie. Introducing [address]! Tino Brewster introduces [employer] patient portal. Now you can access parts of your medical record, email your doctor's office, and request medication refills online. 1. In your internet browser, go to [email or web address] 2. Click on the First Time User? Click Here link in the Sign In box. You will see the New Member Sign Up page. 3. Enter your [employer] Access Code exactly as it appears below. You will not need to use this code after youve completed the sign-up process. If you do not sign up before the expiration date, you must request a new code. · [employer] Access Code: [email or web address] Expires: 8/15/2017  1:52 PM 
 
4. Enter the last four digits of your Social Security Number (xxxx) and Date of Birth (mm/dd/yyyy) as indicated and click Submit. You will be taken to the next sign-up page. 5. Create a [employer] ID. This will be your [employer] login ID and cannot be changed, so think of one that is secure and easy to remember. 6. Create a [employer] password. You can change your password at any time. 7. Enter your Password Reset Question and Answer. This can be used at a later time if you forget your password. 8. Enter your e-mail address. You will receive e-mail notification when new information is available in [address] 9. Click Sign Up. You can now view and download portions of your medical record. 10. Click the Download Summary menu link to download a portable copy of your medical information. If you have questions, please visit the Frequently Asked Questions section of the [employer] website. Remember, [employer] is NOT to be used for urgent needs. For medical emergencies, dial 911. Now available from your iPhone and Android! Please provide this summary of care documentation to your next provider. Your primary care clinician is listed as Danya Syed. If you have any questions after today's visit, please call [phone].

## 2017-08-02 NOTE — PROGRESS NOTES
Manjula Yuen is a 37 y.o. female presents today for ED follow up from a fall yesterday where she hit her head. Patient reports of N/V/D, bad headache, dizziness. Pt is in Room # 4        1. Have you been to the ER, urgent care clinic since your last visit? Hospitalized since your last visit? Yes When: yesterday Where: [facility] ED Reason for visit: fall with head injury    2. Have you seen or consulted any other health care providers outside of the [address] [place] [address] Pool since your last visit? Include any pap smears or colon screening.  No

## 2017-08-08 ENCOUNTER — OFFICE VISIT (OUTPATIENT)
Dept: FAMILY MEDICINE CLINIC | Age: 42
End: 2017-08-08

## 2017-08-08 VITALS
DIASTOLIC BLOOD PRESSURE: 68 MMHG | HEART RATE: 90 BPM | BODY MASS INDEX: 31.86 KG/M2 | HEIGHT: 65 IN | RESPIRATION RATE: 20 BRPM | OXYGEN SATURATION: 99 % | WEIGHT: 191.2 LBS | SYSTOLIC BLOOD PRESSURE: 105 MMHG | TEMPERATURE: 97.6 F
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
## 2017-08-08 DIAGNOSIS — S06.0X0D CONCUSSION, WITHOUT LOC, SUBSEQUENT ENCOUNTER: Primary | ICD-10-CM

## 2017-08-08 DIAGNOSIS — R11.0 NAUSEA: ICD-10-CM

## 2017-08-08 DIAGNOSIS — R42 DIZZINESS: ICD-10-CM

## 2017-08-08 RX ORDER — ONDANSETRON 4 MG/1
4 TABLET, ORALLY DISINTEGRATING ORAL
Qty: 15 TAB | Refills: 0 | Status: SHIPPED | OUTPATIENT
Start: 2017-08-08 | End: 2018-01-03 | Stop reason: SDUPTHER
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
## 2017-08-08 NOTE — MR AVS SNAPSHOT
Visit Information Date & Time Provider Department Dept. Phone Encounter #  
 8/8/2017  2:45 PM [place]afa NP [id number] [address] [phone] Follow-up Instructions Return if symptoms worsen or fail to improve. Your Appointments 12/27/2017 11:40 AM  
Follow Up with Ramsey Tran MD  
[address]) Appt Note: 6 month fu  
 [address] [facility] [address] [place] [id number] [phone]  
  
   
 [place] [id number]  
  
    
 1/19/2018 11:00 AM  
ROUTINE CARE with Fareed Mustafa NP [address] [address]) Appt Note: return in 6 months for a f/u for cholesterol [address] [facility] 83 [address] [employer]  
  
   
 [address] [facility] Upcoming Health Maintenance Date Due INFLUENZA AGE 9 TO ADULT 8/1/2017 PAP AKA CERVICAL CYTOLOGY 7/1/2019 DTaP/Tdap/Td series (2 - Td) 7/21/2027 Allergies as of 8/8/2017  Review Complete On: 8/8/2017 By: Fareed Mustafa NP Severity Noted Reaction Type Reactions Relpax [Eletriptan Hbr] High 02/25/2013    Anaphylaxis Relpax [Eletriptan Hbr]  04/24/2014    Sneezing Ultram [Tramadol]  02/25/2013    Nausea Only Ultram [Tramadol]  04/24/2014    Nausea and Vomiting Current Immunizations  Reviewed on 10/12/2016 Name Date Pneumococcal Polysaccharide (PPSV-23) 7/21/2017 Td, Adsorbed PF 11/16/2015  1:25 PM  
  
 Not reviewed this visit You Were Diagnosed With   
  
 Codes Comments Concussion, without LOC, subsequent encounter    -  Primary ICD-10-CM: S06.0X0D ICD-9-CM: V58.89 Fall, subsequent encounter     ICD-10-CM: W19. [place] ICD-9-CM: V58.89, E888.9 Dizziness     ICD-10-CM: H03 ICD-9-CM: 780.4 Nausea     ICD-10-CM: R11.0 ICD-9-CM: 787.02 Vitals BP Pulse Temp Resp Height(growth percentile) Weight(growth percentile) 105/68 (BP 1 Location: Right arm, BP Patient Position: Sitting) 90 97.6 °F (36.4 °C) (Oral) 20 5' 5\" (1.651 m) 191 lb 3.2 oz (86.7 kg) LMP SpO2 BMI OB Status Smoking Status 09/28/2016 (LMP Unknown) 99% 31.82 kg/m2 Hysterectomy Current Every Day Smoker BMI and BSA Data Body Mass Index Body Surface Area  
 31.82 kg/m 2 1.99 m 2 Preferred Pharmacy Pharmacy Name Phone [facility] [address], [address] [phone] Your Updated Medication List  
  
   
This list is accurate as of: 8/8/17  2:55 PM.  Always use your most recent med list.  
  
  
  
  
 Lucy Deaner 250-50 mcg/dose diskus inhaler Generic drug:  fluticasone-salmeterol INHALE ONE PUFF BY MOUTH TWICE DAILY  
  
 albuterol 90 mcg/actuation inhaler Commonly known as:  PROVENTIL HFA, VENTOLIN HFA, PROAIR HFA Take 2 Puffs by inhalation every four (4) hours as needed for Wheezing. butalbital-acetaminophen-caffeine -40 mg per tablet Commonly known as:  Wendy Round Take 1 Tab by mouth every six (6) hours as needed for Pain or Headache.  
  
 calcium carbonate 500 mg calcium (1,250 mg) tablet Commonly known as:  CALCIUM 500 Take 1 Tab by mouth daily. docusate sodium 100 mg capsule Commonly known as:  Gwendel Laser Take 1 Cap by mouth two (2) times a day. KlonoPIN 0.5 mg tablet Generic drug:  clonazePAM  
Take  by mouth nightly as needed. LaMICtal 25 mg tablet Generic drug:  lamoTRIgine Take 50 mg by mouth nightly. LORTAB  PO Take  by mouth. methocarbamol 500 mg tablet Commonly known as:  ROBAXIN Take 1 Tab by mouth four (4) times daily as needed. nortriptyline 75 mg capsule Commonly known as:  PAMELOR Take 1 Cap by mouth nightly. ondansetron 4 mg disintegrating tablet Commonly known as:  ZOFRAN ODT  
 Take 1 Tab by mouth every eight (8) hours as needed for Nausea. pantoprazole 40 mg tablet Commonly known as:  PROTONIX  
TAKE ONE TABLET BY MOUTH ONCE DAILY  
  
 simvastatin 20 mg tablet Commonly known as:  ZOCOR  
TAKE ONE TABLET BY MOUTH NIGHTLY  
  
 topiramate 200 mg tablet Commonly known as:  TOPAMAX TAKE ONE TABLET BY MOUTH TWICE DAILY  Indications: MIGRAINE PREVENTION  
  
 venlafaxine- mg capsule Commonly known as:  EFFEXOR-XR Take 150 mg by mouth daily. Prescriptions Sent to Pharmacy Refills  
 ondansetron (ZOFRAN ODT) 4 mg disintegrating tablet 0 Sig: Take 1 Tab by mouth every eight (8) hours as needed for Nausea. Class: Normal  
 Pharmacy: [facility] [address], [address] Ph #: [phone] Route: Oral  
  
Follow-up Instructions Return if symptoms worsen or fail to improve. Patient Instructions Concussion: Care Instructions Your Care Instructions A concussion is a kind of injury to the brain. It happens when the head receives a hard blow. The impact can jar or shake the brain against the skull. This interrupts the brain's normal activities. Although you may have cuts or bruises on your head or face, you may have no other visible signs of a brain injury. In most cases, damage to the brain from a concussion can't be seen in tests such as a CT or MRI scan. For a few weeks, you may have low energy, dizziness, trouble sleeping, a headache, ringing in your ears, or nausea. You may also feel anxious, grumpy, or depressed. You may have problems with memory and concentration. These symptoms are common after a concussion. They should slowly improve over time. Sometimes this takes weeks or even months. Someone who lives with you should know how to care for you. Please share this and all information with a caregiver who will be available to help if needed. Follow-up care is a key part of your treatment and safety. Be sure to make and go to all appointments, and call your doctor if you are having problems. It's also a good idea to know your test results and keep a list of the medicines you take. How can you care for yourself at home? Pain control · Put ice or a cold pack on the part of your head that hurts for 10 to 20 minutes at a time. Put a thin cloth between the ice and your skin. · Be safe with medicines. Read and follow all instructions on the label. ¨ If the doctor gave you a prescription medicine for pain, take it as prescribed. ¨ If you are not taking a prescription pain medicine, ask your doctor if you can take an over-the-counter medicine. Recovery · Follow your doctor's instructions. He or she will tell you if you need someone to watch you closely for the next 24 hours or longer. · Rest is the best way to recover from a concussion. You need to rest your body and your brain: ¨ Get plenty of sleep at night. And take rest breaks during the day. ¨ Avoid activities that take a lot of physical or mental work. This includes housework, exercise, schoolwork, video games, text messaging, and using the computer. ¨ You may need to change your school or work schedule while you recover. ¨ Return to your normal activities slowly. Do not try to do too much at once. · Do not drink alcohol or use illegal drugs. Alcohol and illegal drugs can slow your recovery. And they can increase your risk of a second brain injury. · Avoid activities that could lead to another concussion. Follow your doctor's instructions for a gradual return to activity and sports. · Ask your doctor when it's okay for you to drive a car, ride a bike, or operate machinery. How should you return to activity? Your return to sports or activity should be gradual. It should only begin when all symptoms of a concussion are gone, both while at rest and during exercise or exertion. Doctors and concussion specialists suggest steps to follow for returning to sports after a concussion. Use these steps as a guide. In most places, your doctor must give you written permission for your child to begin the steps and return to sports. You should slowly progress through the following levels of activity: 1. No activity. This means complete physical and mental rest. 
2. Light aerobic activity. This can include walking, swimming, or other exercise at less than 70% of maximum heart rate. No resistance training is included in this step. 3. Sport-specific exercise. This includes running drills or skating drills (depending on the sport), but no head impact. 4. Noncontact training drills. This includes more complex training drills such as passing. The athlete may also begin light resistance training. 5. Full-contact practice. The athlete can participate in normal training. 6. Return to normal game play. This is the final step and allows the athlete to join in normal game play. Watch and keep track of your progress. It should take at least 6 days for you to go from light activity to normal game play. Make sure that you can stay at each new level of activity for at least 24 hours without symptoms, or as long as your doctor says, before doing more. If one or more symptoms come back, return to a lower level of activity for at least 24 hours. Don't move on until all symptoms are gone. When should you call for help? Call 911 anytime you think you may need emergency care. For example, call if: 
· You have a seizure. · You passed out (lost consciousness). · You are confused or can't stay awake. Call your doctor now or seek immediate medical care if: 
· You have new or worse vomiting. · You feel less alert. · You have new weakness or numbness in any part of your body. Watch closely for changes in your health, and be sure to contact your doctor if: 
· You do not get better as expected. · You have new symptoms, such as headaches, trouble concentrating, or changes in mood. Where can you learn more? Go to http://romario-clover.info/. Enter F754 in the search box to learn more about \"Concussion: Care Instructions. \" Current as of: September 20, 2016 Content Version: 11.3 © 3317-3058 [employer]. Care instructions adapted under license by [employer] (which disclaims liability or warranty for this information). If you have questions about a medical condition or this instruction, always ask your healthcare professional. [address] any warranty or liability for your use of this information. Introducing [facility]! Shivam Hunt introduces AudioCatch patient portal. Now you can access parts of your medical record, email your doctor's office, and request medication refills online. 1. In your internet browser, go to [email or web address] 2. Click on the First Time User? Click Here link in the Sign In box. You will see the New Member Sign Up page. 3. Enter your AudioCatch Access Code exactly as it appears below. You will not need to use this code after youve completed the sign-up process. If you do not sign up before the expiration date, you must request a new code. · AudioCatch Access Code: [email or web address] Expires: 8/15/2017  1:52 PM 
 
4. Enter the last four digits of your Social Security Number (xxxx) and Date of Birth (mm/dd/yyyy) as indicated and click Submit. You will be taken to the next sign-up page. 5. Create a EarLenst ID. This will be your AudioCatch login ID and cannot be changed, so think of one that is secure and easy to remember. 6. Create a EarLenst password. You can change your password at any time. 7. Enter your Password Reset Question and Answer. This can be used at a later time if you forget your password. 8. Enter your e-mail address.  You will receive e-mail notification when new information is available in [employer]. 9. Click Sign Up. You can now view and download portions of your medical record. 10. Click the Download Summary menu link to download a portable copy of your medical information. If you have questions, please visit the Frequently Asked Questions section of the [employer] website. Remember, [employer] is NOT to be used for urgent needs. For medical emergencies, dial 911. Now available from your iPhone and Android! Please provide this summary of care documentation to your next provider. Your primary care clinician is listed as Jeri Vasquez. If you have any questions after today's visit, please call [phone].

## 2017-08-08 NOTE — PROGRESS NOTES
SUBJECTIVE:  Chuckie Peguero is a 37 y.o. female who presents today for follow up for head pain    1. Have you been to the ER, urgent care clinic since your last visit? Hospitalized since your last visit? NO    2. Have you seen or consulted any other health care providers outside of the [address] [place] [address] since your last visit? Include any pap smears or colon screening.  NO    Health Maintenance reviewed   NO    Health Maintenance Due   Topic Date Due    INFLUENZA AGE 9 TO ADULT  08/01/2017

## 2017-08-08 NOTE — PATIENT INSTRUCTIONS
Concussion: Care Instructions  Your Care Instructions    A concussion is a kind of injury to the brain. It happens when the head receives a hard blow. The impact can jar or shake the brain against the skull. This interrupts the brain's normal activities. Although you may have cuts or bruises on your head or face, you may have no other visible signs of a brain injury. In most cases, damage to the brain from a concussion can't be seen in tests such as a CT or MRI scan. For a few weeks, you may have low energy, dizziness, trouble sleeping, a headache, ringing in your ears, or nausea. You may also feel anxious, grumpy, or depressed. You may have problems with memory and concentration. These symptoms are common after a concussion. They should slowly improve over time. Sometimes this takes weeks or even months. Someone who lives with you should know how to care for you. Please share this and all information with a caregiver who will be available to help if needed. Follow-up care is a key part of your treatment and safety. Be sure to make and go to all appointments, and call your doctor if you are having problems. It's also a good idea to know your test results and keep a list of the medicines you take. How can you care for yourself at home? Pain control  · Put ice or a cold pack on the part of your head that hurts for 10 to 20 minutes at a time. Put a thin cloth between the ice and your skin. · Be safe with medicines. Read and follow all instructions on the label. ¨ If the doctor gave you a prescription medicine for pain, take it as prescribed. ¨ If you are not taking a prescription pain medicine, ask your doctor if you can take an over-the-counter medicine. Recovery  · Follow your doctor's instructions. He or she will tell you if you need someone to watch you closely for the next 24 hours or longer. · Rest is the best way to recover from a concussion.  You need to rest your body and your brain:  ¨ Get plenty of sleep at night. And take rest breaks during the day. ¨ Avoid activities that take a lot of physical or mental work. This includes housework, exercise, schoolwork, video games, text messaging, and using the computer. ¨ You may need to change your school or work schedule while you recover. ¨ Return to your normal activities slowly. Do not try to do too much at once. · Do not drink alcohol or use illegal drugs. Alcohol and illegal drugs can slow your recovery. And they can increase your risk of a second brain injury. · Avoid activities that could lead to another concussion. Follow your doctor's instructions for a gradual return to activity and sports. · Ask your doctor when it's okay for you to drive a car, ride a bike, or operate machinery. How should you return to activity? Your return to sports or activity should be gradual. It should only begin when all symptoms of a concussion are gone, both while at rest and during exercise or exertion. Doctors and concussion specialists suggest steps to follow for returning to sports after a concussion. Use these steps as a guide. In most places, your doctor must give you written permission for your child to begin the steps and return to sports. You should slowly progress through the following levels of activity:  1. No activity. This means complete physical and mental rest.  2. Light aerobic activity. This can include walking, swimming, or other exercise at less than 70% of maximum heart rate. No resistance training is included in this step. 3. Sport-specific exercise. This includes running drills or skating drills (depending on the sport), but no head impact. 4. Noncontact training drills. This includes more complex training drills such as passing. The athlete may also begin light resistance training. 5. Full-contact practice. The athlete can participate in normal training. 6. Return to normal game play.  This is the final step and allows the athlete to join in normal game play. Watch and keep track of your progress. It should take at least 6 days for you to go from light activity to normal game play. Make sure that you can stay at each new level of activity for at least 24 hours without symptoms, or as long as your doctor says, before doing more. If one or more symptoms come back, return to a lower level of activity for at least 24 hours. Don't move on until all symptoms are gone. When should you call for help? Call 911 anytime you think you may need emergency care. For example, call if:  · You have a seizure. · You passed out (lost consciousness). · You are confused or can't stay awake. Call your doctor now or seek immediate medical care if:  · You have new or worse vomiting. · You feel less alert. · You have new weakness or numbness in any part of your body. Watch closely for changes in your health, and be sure to contact your doctor if:  · You do not get better as expected. · You have new symptoms, such as headaches, trouble concentrating, or changes in mood. Where can you learn more? Go to http://romario-clover.info/. Enter J849 in the search box to learn more about \"Concussion: Care Instructions. \"  Current as of: September 20, 2016  Content Version: 11.3  © 9251-2909 [employer]. Care instructions adapted under license by [employer] (which disclaims liability or warranty for this information). If you have questions about a medical condition or this instruction, always ask your healthcare professional. [place] [address] any warranty or liability for your use of this information.

## 2017-10-11 ENCOUNTER — TELEPHONE (OUTPATIENT)
Dept: FAMILY MEDICINE CLINIC | Age: 42
End: 2017-10-11

## 2017-10-11 NOTE — TELEPHONE ENCOUNTER
Called [facility] #[phone] in regards to documents faxed to the office that needed PCP signature verifying that patient is not able to partake in any type of employment due to a medical issue. Spoke to Ms Clover Sanders and she states that the patient had stated that she is unable to work because of her medical condition. Ms Whitleymike Tommy states that Ms Alvino Nam had given several doctors and that they were all contacted, including our office to verify this. Explained to Ms Clover Sanders that FNP-C Angy Long will be unable to sign document because there is no present documentation in patient chart to substantiate this claim while under her present care.    Closing encounter

## 2017-12-11 ENCOUNTER — HOSPITAL ENCOUNTER (OUTPATIENT)
Dept: MAMMOGRAPHY | Age: 42
Discharge: HOME | End: 2017-12-11
Attending: OBSTETRICS & GYNECOLOGY
Payer: MEDICAID

## 2017-12-11 DIAGNOSIS — Z12.39 BREAST CANCER SCREENING: ICD-10-CM

## 2017-12-11 PROCEDURE — 77067 SCR MAMMO BI INCL CAD: CPT

## 2017-12-20 ENCOUNTER — HOSPITAL ENCOUNTER (OUTPATIENT)
Dept: ULTRASOUND IMAGING | Age: 42
Discharge: HOME | End: 2017-12-20
Attending: OBSTETRICS & GYNECOLOGY
Payer: MEDICAID

## 2017-12-20 ENCOUNTER — HOSPITAL ENCOUNTER (OUTPATIENT)
Dept: MAMMOGRAPHY | Age: 42
Discharge: HOME | End: 2017-12-20
Attending: OBSTETRICS & GYNECOLOGY
Payer: MEDICAID

## 2017-12-20 DIAGNOSIS — N64.89 BREAST ASYMMETRY: ICD-10-CM

## 2017-12-20 PROCEDURE — 76642 ULTRASOUND BREAST LIMITED: CPT

## 2017-12-20 PROCEDURE — 77065 DX MAMMO INCL CAD UNI: CPT

## 2018-01-03 ENCOUNTER — OFFICE VISIT (OUTPATIENT)
Dept: FAMILY MEDICINE CLINIC | Age: 43
End: 2018-01-03

## 2018-01-03 VITALS
HEIGHT: 65 IN | BODY MASS INDEX: 32.49 KG/M2 | DIASTOLIC BLOOD PRESSURE: 67 MMHG | RESPIRATION RATE: 20 BRPM | HEART RATE: 83 BPM | WEIGHT: 195 LBS | SYSTOLIC BLOOD PRESSURE: 111 MMHG | TEMPERATURE: 98.8 F

## 2018-01-03 DIAGNOSIS — Z20.7 EXPOSURE TO HEAD LICE: Primary | ICD-10-CM

## 2018-01-03 DIAGNOSIS — R05.9 COUGH: ICD-10-CM

## 2018-01-03 DIAGNOSIS — R11.0 NAUSEA: ICD-10-CM

## 2018-01-03 DIAGNOSIS — J45.30 MILD PERSISTENT ASTHMA WITHOUT COMPLICATION: ICD-10-CM

## 2018-01-03 RX ORDER — ALBUTEROL SULFATE 90 UG/1
1 AEROSOL, METERED RESPIRATORY (INHALATION)
Qty: 1 INHALER | Refills: 2 | Status: SHIPPED | OUTPATIENT
Start: 2018-01-03

## 2018-01-03 RX ORDER — BENZONATATE 100 MG/1
100 CAPSULE ORAL
Qty: 21 CAP | Refills: 0 | Status: SHIPPED | OUTPATIENT
Start: 2018-01-03 | End: 2018-01-10

## 2018-01-03 RX ORDER — ONDANSETRON 4 MG/1
4 TABLET, ORALLY DISINTEGRATING ORAL
Qty: 15 TAB | Refills: 0 | Status: SHIPPED | OUTPATIENT
Start: 2018-01-03 | End: 2018-02-09 | Stop reason: SDUPTHER
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
## 2018-01-03 NOTE — PATIENT INSTRUCTIONS
Head Lice: Care Instructions  Your Care Instructions    Head lice are tiny bugs that can live in your hair and on your head. Live lice are tan to grayish white. They're about the size of a sesame seed. It may be easiest to find them at the base of the scalp, at the bottom of the neck, and behind the ears. When you have lice, all people living in your home need to be carefully checked and then treated. Lice eggs (nits) may be easier to see than live lice. They look like tiny yellow or white dots attached to the hair, close to the scalp. They're often easier to see than live lice. Nits can look like dandruff. But you can't pick them off with your fingernail or brush them away. Lice aren't dangerous. They don't spread disease or have anything to do with how clean someone is. The lice may make your head itch. Lice won't go away without treatment. You can treat lice and their eggs with prescription or over-the-counter medicines. After treatment, your skin may still itch for a week or more. This is because of your body's reaction to the lice. Follow-up care is a key part of your treatment and safety. Be sure to make and go to all appointments, and call your doctor if you are having problems. It's also a good idea to know your test results and keep a list of the medicines you take. How can you care for yourself at home? · Use an over-the-counter medicine to kill lice. It's important to use any medicine correctly and to choose a medicine that is safe. Talk to your doctor or pharmacist if you have questions. · Check your scalp for live lice 48 hours after treatment. If you find some, try a different type of treatment. It may be that the lice in your area are resistant to the first treatment you tried. · Check your scalp again 7 to 10 days after the first treatment. If you find live lice, you need a second treatment.  This is to make sure that all lice are killed, including those that hatched since the first treatment. · Try not to scratch. It may help to use an over-the-counter cream or calamine lotion to calm the itching. If the itching is really bad, ask the doctor about an over-the-counter antihistamine, such as diphenhydramine (Benadryl) or loratadine (Claritin). Read and follow all instructions on the label. · You may want to remove nits after treatment, but you don't have to remove them all. Some people use a special comb to remove nits after using lice medicine. The monreal are often packaged with over-the-counter lice shampoos. A flea comb that's made for dogs and cats will also work. · Try not to share anything that comes into contact with hair. For example, don't share hair bands, barrettes, towels, hats, monreal, or brushes. Teach your children not to share anything that comes into contact with hair if they have lice. · You don't need to spend a lot of time or money deep cleaning your home. But it is a good idea to:  [employer], monreal, barrettes, and other items for 10 minutes in hot water (at least 130°F). ¨ Vacuum carpets, mattresses, couches, and other fabric-covered furniture. ¨ Machine-wash clothes, bedding, towels, and hats in hot water (at least 130°F). Dry them in a hot dryer. If you don't have access to a washing machine, instead you can store these items in a sealed plastic bag for 14 days. When should you call for help? Call your doctor now or seek immediate medical care if:  ? · You have signs of a skin infection, such as:  ¨ Increased pain, swelling, warmth, and redness. ¨ Red streaks coming from an area of the scalp. ¨ Pus draining from the area. ¨ A fever. ? Watch closely for changes in your health, and be sure to contact your doctor if:  ? · You see live lice or new nits after you have followed the directions for your medicine. ? · Anyone else in your family has lice. ? · You do not get better as expected. Where can you learn more?   Go to http://romario-clover.info/. Enter R349 in the search box to learn more about \"Head Lice: Care Instructions. \"  Current as of: October 13, 2016  Content Version: 11.4  © 8726-3886 Healthwise, Incorporated. Care instructions adapted under license by [employer] (which disclaims liability or warranty for this information). If you have questions about a medical condition or this instruction, always ask your healthcare professional. [place] [address] any warranty or liability for your use of this information.

## 2018-01-03 NOTE — MR AVS SNAPSHOT
Visit Information Date & Time Provider Department Dept. Phone Encounter #  
 1/3/2018  4:00 PM Danilo Pina, [address] [facility] [phone] [id number] Follow-up Instructions Return if symptoms worsen or fail to improve. Your Appointments 1/3/2018  4:00 PM  
ROUT[place] [id number] [address]) Appt Note: new prescriptions request/bug in the head [address] [place] [address] [address] [place], [place] [address] [address]  
  
   
 [address] [place] [address] [address] [facility] 1/19/2018 11:00 AM  
ROUTINE [place] [id number] [address]) Appt Note: return in 6 months for a f/u for cholesterol [address] [place] [address] [address] [facility] [id number]  
[phone]  
  
    
 1/29/2018  2:40 PM  
Follow Up with Rey Izaguirre MD  
[address]) Appt Note: 6 month fu; r/s due to provider on call 11/30 [address] [address] [phone]  
  
   
 [facility] [address] Upcoming Health Maintenance Date Due Influenza Age 5 to Adult 8/1/2017 PAP AKA CERVICAL CYTOLOGY 7/1/2019 DTaP/Tdap/Td series (2 - Td) 7/21/2027 Allergies as of 1/3/2018  Review Complete On: 1/3/2018 By: Danilo Pina NP Severity Noted Reaction Type Reactions Relpax [Eletriptan Hbr] High 02/25/2013    Anaphylaxis Relpax [Eletriptan Hbr]  04/24/2014    Sneezing Ultram [Tramadol]  02/25/2013    Nausea Only Ultram [Tramadol]  04/24/2014    Nausea and Vomiting Current Immunizations  Reviewed on 10/12/2016 Name Date Pneumococcal Polysaccharide (PPSV-23) 7/21/2017 Td, Adsorbed PF 11/16/2015  1:25 PM  
  
 Not reviewed this visit You Were Diagnosed With   
  
 Codes Comments Exposure to head lice    -  Primary DQZ-51-TD: Z20.7 ICD-9-CM: V01.89   
 Mild persistent asthma without complication     [facility]: J45.30 ICD-9-CM: 493.90 Cough     ICD-10-CM: R05 ICD-9-CM: 786.2 Nausea     ICD-10-CM: R11.0 ICD-9-CM: 787.02 Vitals BP Pulse Temp Resp Height(growth percentile) Weight(growth percentile) 111/67 (BP 1 Location: Right arm, BP Patient Position: Sitting) 83 98.8 °F (37.1 °C) (Oral) 20 5' 5\" (1.651 m) 195 lb (88.5 kg) LMP BMI OB Status Smoking Status 09/28/2016 (LMP Unknown) 32.45 kg/m2 Hysterectomy Current Every Day Smoker BMI and BSA Data Body Mass Index Body Surface Area  
 32.45 kg/m 2 2.01 m 2 Preferred Pharmacy Pharmacy Name Phone [facility] [address], [address] [place] [address] [place] [phone] Your Updated Medication List  
  
   
This list is accurate as of: 1/3/18  3:50 PM.  Always use your most recent med list.  
  
  
  
  
 Avanell Ramírez 250-50 mcg/dose diskus inhaler Generic drug:  fluticasone-salmeterol INHALE ONE DOSE BY MOUTH TWICE DAILY  
  
 albuterol 90 mcg/actuation inhaler Commonly known as:  PROVENTIL HFA, VENTOLIN HFA, PROAIR HFA Take 1 Puff by inhalation every four (4) hours as needed for Wheezing. benzonatate 100 mg capsule Commonly known as:  TESSALON Take 1 Cap by mouth three (3) times daily as needed for Cough for up to 7 days. butalbital-acetaminophen-caffeine -40 mg per tablet Commonly known as:  Vernona Sober Take 1 Tab by mouth every six (6) hours as needed for Pain or Headache.  
  
 calcium carbonate 500 mg calcium (1,250 mg) tablet Commonly known as:  CALCIUM 500 Take 1 Tab by mouth daily. docusate sodium 100 mg capsule Commonly known as:  Peter Torres Take 1 Cap by mouth two (2) times a day. KlonoPIN 0.5 mg tablet Generic drug:  clonazePAM  
Take  by mouth nightly as needed. LaMICtal 25 mg tablet Generic drug:  lamoTRIgine Take 50 mg by mouth nightly.   
  
 LORTAB  PO  
 Take  by mouth. methocarbamol 500 mg tablet Commonly known as:  ROBAXIN Take 1 Tab by mouth four (4) times daily as needed. nortriptyline 75 mg capsule Commonly known as:  PAMELOR Take 1 Cap by mouth nightly. ondansetron 4 mg disintegrating tablet Commonly known as:  ZOFRAN ODT Take 1 Tab by mouth every eight (8) hours as needed for Nausea. pantoprazole 40 mg tablet Commonly known as:  PROTONIX  
TAKE ONE TABLET BY MOUTH ONCE DAILY permethrin 1 % lotion Commonly known as:  LICE KILLING (PERMETHRIN)  
follow package directions  
  
 simvastatin 20 mg tablet Commonly known as:  ZOCOR  
TAKE ONE TABLET BY MOUTH NIGHTLY  
  
 topiramate 200 mg tablet Commonly known as:  TOPAMAX TAKE ONE TABLET BY MOUTH TWICE DAILY  Indications: MIGRAINE PREVENTION  
  
 venlafaxine- mg capsule Commonly known as:  EFFEXOR-XR Take 150 mg by mouth daily. Prescriptions Sent to Pharmacy Refills  
 permethrin (LICE KILLING, PERMETHRIN,) 1 % lotion 0 Sig: follow package directions Class: Normal  
 Pharmacy: [facility] [address], [address] Ph #: [phone]  
 albuterol (PROVENTIL HFA, VENTOLIN HFA, PROAIR HFA) 90 mcg/actuation inhaler 2 Sig: Take 1 Puff by inhalation every four (4) hours as needed for Wheezing. Class: Normal  
 Pharmacy: [facility] [address], [address] Ph #: [phone] Route: Inhalation  
 benzonatate (TESSALON) 100 mg capsule 0 Sig: Take 1 Cap by mouth three (3) times daily as needed for Cough for up to 7 days. Class: Normal  
 Pharmacy: [facility] [address], [address] Ph #: [phone] Route: Oral  
 ondansetron (ZOFRAN ODT) 4 mg disintegrating tablet 0 Sig: Take 1 Tab by mouth every eight (8) hours as needed for Nausea. Class: Normal  
 Pharmacy: [place] [address], [address] Ph #: [phone] Route: Oral  
  
Follow-up Instructions Return if symptoms worsen or fail to improve. To-Do List   
 06/20/2018 9:30 AM  
  Appointment with [facility] CRISTINA Solis 9 RM 1 at [facility] ([phone]) OUTSIDE FILMS  - Any outside films related to the study being scheduled should be brought with you on the day of the exam.  If this cannot be done there may be a delay in the reading of the study. MEDICATIONS  - Patient must bring a complete list of all medications currently taking to include prescriptions, over-the-counter meds, herbals, vitamins & any dietary supplements  GENERAL INSTRUCTIONS  - On the day of your exam do not use any bath powder, deodorant or lotions on the armpit area. Patient Instructions Head Lice: Care Instructions Your Care Instructions Head lice are tiny bugs that can live in your hair and on your head. Live lice are tan to grayish white. They're about the size of a sesame seed. It may be easiest to find them at the base of the scalp, at the bottom of the neck, and behind the ears. When you have lice, all people living in your home need to be carefully checked and then treated. Lice eggs (nits) may be easier to see than live lice. They look like tiny yellow or white dots attached to the hair, close to the scalp. They're often easier to see than live lice. Nits can look like dandruff. But you can't pick them off with your fingernail or brush them away. Lice aren't dangerous. They don't spread disease or have anything to do with how clean someone is. The lice may make your head itch. Lice won't go away without treatment. You can treat lice and their eggs with prescription or over-the-counter medicines. After treatment, your skin may still itch for a week or more. This is because of your body's reaction to the lice. Follow-up care is a key part of your treatment and safety.  Be sure to make and go to all appointments, and call your doctor if you are having problems. It's also a good idea to know your test results and keep a list of the medicines you take. How can you care for yourself at home? · Use an over-the-counter medicine to kill lice. It's important to use any medicine correctly and to choose a medicine that is safe. Talk to your doctor or pharmacist if you have questions. · Check your scalp for live lice 48 hours after treatment. If you find some, try a different type of treatment. It may be that the lice in your area are resistant to the first treatment you tried. · Check your scalp again 7 to 10 days after the first treatment. If you find live lice, you need a second treatment. This is to make sure that all lice are killed, including those that hatched since the first treatment. · Try not to scratch. It may help to use an over-the-counter cream or calamine lotion to calm the itching. If the itching is really bad, ask the doctor about an over-the-counter antihistamine, such as diphenhydramine (Benadryl) or loratadine (Claritin). Read and follow all instructions on the label. · You may want to remove nits after treatment, but you don't have to remove them all. Some people use a special comb to remove nits after using lice medicine. The monreal are often packaged with over-the-counter lice shampoos. A flea comb that's made for dogs and cats will also work. · Try not to share anything that comes into contact with hair. For example, don't share hair bands, barrettes, towels, hats, monreal, or brushes. Teach your children not to share anything that comes into contact with hair if they have lice. · You don't need to spend a lot of time or money deep cleaning your home. But it is a good idea to: 
[employer], monreal, barrettes, and other items for 10 minutes in hot water (at least 130°F). ¨ Vacuum carpets, mattresses, couches, and other fabric-covered furniture. ¨ Machine-wash clothes, bedding, towels, and hats in hot water (at least 130°F). Dry them in a hot dryer. If you don't have access to a washing machine, instead you can store these items in a sealed plastic bag for 14 days. When should you call for help? Call your doctor now or seek immediate medical care if: 
? · You have signs of a skin infection, such as: 
¨ Increased pain, swelling, warmth, and redness. ¨ Red streaks coming from an area of the scalp. ¨ Pus draining from the area. ¨ A fever. ? Watch closely for changes in your health, and be sure to contact your doctor if: 
? · You see live lice or new nits after you have followed the directions for your medicine. ? · Anyone else in your family has lice. ? · You do not get better as expected. Where can you learn more? Go to http://romario-clover.info/. Enter R349 in the search box to learn more about \"Head Lice: Care Instructions. \" Current as of: October 13, 2016 Content Version: 11.4 © 9789-5255 [employer]able Internet. Care instructions adapted under license by [employer] (which disclaims liability or warranty for this information). If you have questions about a medical condition or this instruction, always ask your healthcare professional. [address] any warranty or liability for your use of this information. Introducing [facility]! Lisa Campos introduces Ripl patient portal. Now you can access parts of your medical record, email your doctor's office, and request medication refills online. 1. In your internet browser, go to [email or web address] 2. Click on the First Time User? Click Here link in the Sign In box. You will see the New Member Sign Up page. 3. Enter your Ripl Access Code exactly as it appears below. You will not need to use this code after youve completed the sign-up process.  If you do not sign up before the expiration date, you must request a new code. · Nezasa Access Code: [email or web address] Expires: 2/19/2018 12:24 PM 
 
4. Enter the last four digits of your Social Security Number (xxxx) and Date of Birth (mm/dd/yyyy) as indicated and click Submit. You will be taken to the next sign-up page. 5. Create a Nezasa ID. This will be your Nezasa login ID and cannot be changed, so think of one that is secure and easy to remember. 6. Create a Nezasa password. You can change your password at any time. 7. Enter your Password Reset Question and Answer. This can be used at a later time if you forget your password. 8. Enter your e-mail address. You will receive e-mail notification when new information is available in [address] 9. Click Sign Up. You can now view and download portions of your medical record. 10. Click the Download Summary menu link to download a portable copy of your medical information. If you have questions, please visit the Frequently Asked Questions section of the Nezasa website. Remember, Nezasa is NOT to be used for urgent needs. For medical emergencies, dial 911. Now available from your iPhone and Android! Please provide this summary of care documentation to your next provider. Your primary care clinician is listed as Danilo Pina. If you have any questions after today's visit, please call [phone].

## 2018-01-03 NOTE — PROGRESS NOTES
SUBJECTIVE:  Song Sheehan is a 40 y.o. female who presents today for prescription for hair lice. She also complains of nausea. 1. Have you been to the ER, urgent care clinic since your last visit? Hospitalized since your last visit? NO    2. Have you seen or consulted any other health care providers outside of the [address] [place] [address] since your last visit? Include any pap smears or colon screening. YES    Health Maintenance reviewed  Yes    Health Maintenance Due   Topic Date Due    Influenza Age 5 to Adult  08/01/2017

## 2018-01-08 DIAGNOSIS — E78.2 MIXED HYPERLIPIDEMIA: ICD-10-CM

## 2018-01-08 DIAGNOSIS — E83.51 HYPOCALCEMIA: ICD-10-CM

## 2018-01-26 DIAGNOSIS — E83.51 HYPOCALCEMIA: ICD-10-CM

## 2018-01-26 RX ORDER — CALCIUM CARBONATE 500(1250)
1 TABLET ORAL DAILY
Qty: 30 TAB | Refills: 5 | Status: SHIPPED | OUTPATIENT
Start: 2018-01-26

## 2018-01-29 ENCOUNTER — OFFICE VISIT (OUTPATIENT)
Dept: NEUROLOGY | Age: 43
End: 2018-01-29

## 2018-01-29 VITALS
HEART RATE: 84 BPM | RESPIRATION RATE: 14 BRPM | HEIGHT: 65 IN | DIASTOLIC BLOOD PRESSURE: 64 MMHG | WEIGHT: 186 LBS | TEMPERATURE: 98.5 F | SYSTOLIC BLOOD PRESSURE: 102 MMHG | OXYGEN SATURATION: 98 % | BODY MASS INDEX: 30.99 KG/M2

## 2018-01-29 DIAGNOSIS — G43.909 MIGRAINE WITHOUT STATUS MIGRAINOSUS, NOT INTRACTABLE, UNSPECIFIED MIGRAINE TYPE: ICD-10-CM

## 2018-01-29 DIAGNOSIS — G43.009 MIGRAINE WITHOUT AURA AND WITHOUT STATUS MIGRAINOSUS, NOT INTRACTABLE: ICD-10-CM

## 2018-01-29 RX ORDER — NORTRIPTYLINE HYDROCHLORIDE 50 MG/1
100 CAPSULE ORAL
Qty: 60 CAP | Refills: 6 | Status: SHIPPED | OUTPATIENT
Start: 2018-01-29 | End: 2019-06-17 | Stop reason: SDUPTHER

## 2018-01-29 NOTE — MR AVS SNAPSHOT
Javad Jones 
 
 
 [address] [facility] [address] 
[phone] Patient: Pierre Benitez MRN: [id number] :1975 Visit Information Date & Time Provider Department Dept. Phone Encounter #  
 2018  2:40 PM Richard Barron [facility] [phone] [id number] Follow-up Instructions Return in about 3 months (around 2018). Follow-up and Disposition History Your Appointments 2018  2:40 PM  
Follow Up with Richard Barron MD  
[facility]) Appt Note: 6 month fu; r/s due to provider on call  [facility]; p/t conf appt 18 [facility]  
 [address] [address] [phone]  
  
   
 [place] [address]  
  
    
 2018 11:00 AM  
ROUTINE CARE with Yuan Conklin NP [address] [address] [facility]) Appt Note: return in 6 months for a f/u for cholesterol; r/s 1/15  
 [address] [facility] [address]  
  
   
 [address] [facility] Upcoming Health Maintenance Date Due Influenza Age 5 to Adult 2017 PAP AKA CERVICAL CYTOLOGY 2019 DTaP/Tdap/Td series (2 - Td) 2027 Allergies as of 2018  Review Complete On: 2018 By: Feng Murphy LPN Severity Noted Reaction Type Reactions Relpax [Eletriptan Hbr] High 2013    Anaphylaxis Relpax [Eletriptan Hbr]  2014    Sneezing Ultram [Tramadol]  2013    Nausea Only Ultram [Tramadol]  2014    Nausea and Vomiting Current Immunizations  Reviewed on 10/12/2016 Name Date Pneumococcal Polysaccharide (PPSV-23) 2017 Td, Adsorbed PF 2015  1:25 PM  
  
 Not reviewed this visit You Were Diagnosed With   
  
 Codes Comments Migraine without aura and without status migrainosus, not intractable     ICD-10-CM: K86.309 ICD-9-CM: 346.10 Migraine without status migrainosus, not intractable, unspecified migraine type     ICD-10-CM: G43.909 ICD-9-CM: 346.90 Vitals BP Pulse Temp Resp Height(growth percentile) Weight(growth percentile) 102/64 (BP 1 Location: Left arm, BP Patient Position: Sitting) 84 98.5 °F (36.9 °C) (Oral) 14 5' 5\" (1.651 m) 186 lb (84.4 kg) LMP SpO2 BMI OB Status Smoking Status 09/28/2016 (LMP Unknown) 98% 30.95 kg/m2 Hysterectomy Current Every Day Smoker Vitals History BMI and BSA Data Body Mass Index Body Surface Area 30.95 kg/m 2 1.97 m 2 Preferred Pharmacy Pharmacy Name Phone [address], [address] [facility] [phone] Your Updated Medication List  
  
   
This list is accurate as of: 1/29/18  2:14 PM.  Always use your most recent med list.  
  
  
  
  
 Harlo Phoenix 250-50 mcg/dose diskus inhaler Generic drug:  fluticasone-salmeterol INHALE ONE DOSE BY MOUTH TWICE DAILY  
  
 albuterol 90 mcg/actuation inhaler Commonly known as:  PROVENTIL HFA, VENTOLIN HFA, PROAIR HFA Take 1 Puff by inhalation every four (4) hours as needed for Wheezing. butalbital-acetaminophen-caffeine -40 mg per tablet Commonly known as:  Monae Elk Take 1 Tab by mouth every six (6) hours as needed for Pain or Headache.  
  
 calcium carbonate 500 mg calcium (1,250 mg) tablet Commonly known as:  CALCIUM 500 Take 1 Tab by mouth daily. docusate sodium 100 mg capsule Commonly known as:  Dixon Peeling Take 1 Cap by mouth two (2) times a day. KlonoPIN 0.5 mg tablet Generic drug:  clonazePAM  
Take  by mouth nightly as needed. LaMICtal 25 mg tablet Generic drug:  lamoTRIgine Take 50 mg by mouth nightly. LORTAB  PO Take  by mouth. methocarbamol 500 mg tablet Commonly known as:  ROBAXIN Take 1 Tab by mouth four (4) times daily as needed. nortriptyline 50 mg capsule Commonly known as:  PAMELOR Take 2 Caps by mouth nightly. ondansetron 4 mg disintegrating tablet Commonly known as:  ZOFRAN ODT Take 1 Tab by mouth every eight (8) hours as needed for Nausea. pantoprazole 40 mg tablet Commonly known as:  PROTONIX  
TAKE ONE TABLET BY MOUTH ONCE DAILY permethrin 1 % lotion Commonly known as:  LICE KILLING (PERMETHRIN)  
follow package directions  
  
 simvastatin 20 mg tablet Commonly known as:  ZOCOR  
TAKE ONE TABLET BY MOUTH NIGHTLY  
  
 topiramate 200 mg tablet Commonly known as:  TOPAMAX TAKE ONE TABLET BY MOUTH TWICE DAILY  Indications: MIGRAINE PREVENTION  
  
 venlafaxine- mg capsule Commonly known as:  EFFEXOR-XR Take 150 mg by mouth daily. Prescriptions Sent to Pharmacy Refills  
 nortriptyline (PAMELOR) 50 mg capsule 6 Sig: Take 2 Caps by mouth nightly. Class: Normal  
 Pharmacy: [facility] [address], [address] Ph #: [phone] Route: Oral  
  
Follow-up Instructions Return in about 3 months (around 4/29/2018). To-Do List   
 06/20/2018 9:30 AM  
  Appointment with [address] [facility] [address] RM 1 at [facility] ([phone]) OUTSIDE FILMS  - Any outside films related to the study being scheduled should be brought with you on the day of the exam.  If this cannot be done there may be a delay in the reading of the study. MEDICATIONS  - Patient must bring a complete list of all medications currently taking to include prescriptions, over-the-counter meds, herbals, vitamins & any dietary supplements  GENERAL INSTRUCTIONS  - On the day of your exam do not use any bath powder, deodorant or lotions on the armpit area. Introducing [facility]!    
 Anne Burt introduces [employer] patient portal. Now you can access parts of your medical record, email your doctor's office, and request medication refills online. 1. In your internet browser, go to [email or web address] 2. Click on the First Time User? Click Here link in the Sign In box. You will see the New Member Sign Up page. 3. Enter your [employer] Access Code exactly as it appears below. You will not need to use this code after youve completed the sign-up process. If you do not sign up before the expiration date, you must request a new code. · [employer] Access Code: [email or web address] Expires: 2/19/2018 12:24 PM 
 
4. Enter the last four digits of your Social Security Number (xxxx) and Date of Birth (mm/dd/yyyy) as indicated and click Submit. You will be taken to the next sign-up page. 5. Create a DVDPlay ID. This will be your [employer]Play login ID and cannot be changed, so think of one that is secure and easy to remember. 6. Create a [employer] password. You can change your password at any time. 7. Enter your Password Reset Question and Answer. This can be used at a later time if you forget your password. 8. Enter your e-mail address. You will receive e-mail notification when new information is available in [address] 9. Click Sign Up. You can now view and download portions of your medical record. 10. Click the Download Summary menu link to download a portable copy of your medical information. If you have questions, please visit the Frequently Asked Questions section of the [employer] website. Remember, [employer] is NOT to be used for urgent needs. For medical emergencies, dial 911. Now available from your iPhone and Android! Please provide this summary of care documentation to your next provider. Your primary care clinician is listed as Moy Fernandez. If you have any questions after today's visit, please call [phone].

## 2018-01-29 NOTE — PROGRESS NOTES
Re:  Elsa Si up visit     1/29/2018 1:40 PM          SSN: [id number]    Subjective:   Lesly Russell returns for follow up of her migraines. The headaches seem to be better since starting the Pamelor. She seems to be sleeping better. She was doing better until she developed the flu in August and got dizzy and struck her head on the bathtub. She gets headache daily since the head trauma. They are mostly right frontoparietal.  This is where she struck her head. .                   Medications:    Current Outpatient Prescriptions   Medication Sig Dispense Refill    calcium carbonate (CALCIUM 500) 500 mg calcium (1,250 mg) tablet Take 1 Tab by mouth daily. 30 Tab 5    permethrin (LICE KILLING, PERMETHRIN,) 1 % lotion follow package directions 1 Bottle 0    albuterol (PROVENTIL HFA, VENTOLIN HFA, PROAIR HFA) 90 mcg/actuation inhaler Take 1 Puff by inhalation every four (4) hours as needed for Wheezing. 1 Inhaler 2    ondansetron (ZOFRAN ODT) 4 mg disintegrating tablet Take 1 Tab by mouth every eight (8) hours as needed for Nausea. 15 Tab 0    ADVAIR DISKUS 250-50 mcg/dose diskus inhaler INHALE ONE DOSE BY MOUTH TWICE DAILY 2 Inhaler 0    simvastatin (ZOCOR) 20 mg tablet TAKE ONE TABLET BY MOUTH NIGHTLY 30 Tab 5    pantoprazole (PROTONIX) 40 mg tablet TAKE ONE TABLET BY MOUTH ONCE DAILY 30 Tab 5    HYDROCODONE/ACETAMINOPHEN (LORTAB  PO) Take  by mouth.  topiramate (TOPAMAX) 200 mg tablet TAKE ONE TABLET BY MOUTH TWICE DAILY  Indications: MIGRAINE PREVENTION 60 Tab 6    nortriptyline (PAMELOR) 75 mg capsule Take 1 Cap by mouth nightly. 30 Cap 6    methocarbamol (ROBAXIN) 500 mg tablet Take 1 Tab by mouth four (4) times daily as needed.  (Patient taking differently: Take 750 mg by mouth four (4) times daily as needed.) 30 Tab 0    butalbital-acetaminophen-caffeine (FIORICET, ESGIC) -40 mg per tablet Take 1 Tab by mouth every six (6) hours as needed for Pain or Headache. 30 Tab 0    lamoTRIgine (LAMICTAL) 25 mg tablet Take 50 mg by mouth nightly.  clonazePAM (KLONOPIN) 0.5 mg tablet Take  by mouth nightly as needed.  docusate sodium (COLACE) 100 mg capsule Take 1 Cap by mouth two (2) times a day. 60 Cap 0    venlafaxine-SR (EFFEXOR-XR) 150 mg capsule Take 150 mg by mouth daily. Vital signs:    Visit Vitals    /64 (BP 1 Location: Left arm, BP Patient Position: Sitting)    Pulse 84    Temp 98.5 °F (36.9 °C) (Oral)    Resp 14    Ht 5' 5\" (1.651 m)    Wt 84.4 kg (186 lb)    LMP 09/28/2016 (LMP Unknown)    SpO2 98%    BMI 30.95 kg/m2       Review of Systems:   As above otherwise 11 point review of systems negative including;   Constitutional no fever or chills  Skin denies rash or itching  HEENT  Denies tinnitus, hearing lose  Eyes denies diplopia vision lose  Respiratory denies sortness of breath  Cardiovascular denies chest pain, dyspnea on exertion  Gastrointestinal denies nausea, vomiting, diarrhea, constipation  Genitourinary denies incontinence  Musculoskeletal denies joint pain or swelling  Endocrine denies weight change  Hematology denies easy bruising or bleeding   Neurological as above in HPI      Patient Active Problem List   Diagnosis Code    Asthma J45.909    Migraine G43.909    Hypotension I95.9    Fibromyalgia M79.7    Bipolar depression ([facility] [address] F31.30    History of stroke Z86.73    Gastroesophageal reflux disease with esophagitis K21.0    Hyperlipidemia E78.5    Chronic constipation K59.09    Anemia D64.9    Chronic obstructive pulmonary disease with acute exacerbation (HCC) J44.1    Cigarette smoker F17.210    Left-sided weakness R53.1         Objective: The patient is awake, alert, and oriented x 4. Fund of knowledge is adequate. Speech is fluent and memory is intact. Cranial Nerves: II - Visual fields are full to confrontation. III, IV, VI - Extraocular movements are intact. There is no nystagmus.  V - Facial sensation is intact to pinprick. VII - Face is asymmetrical, she has decreased movement of the left face, but at rest the face is symmetrical.  VIII - Hearing is present. IX, X, XII - Palate is symmetrical.   XI - Shoulder shrugging and head turning intact  Motor: The patient has 5/5 strength in all 4 limbs. Sensation is intact to pinprick. Reflexes are 2+ and symmetrical.  Gait is normal.    CBC:   Lab Results   Component Value Date/Time    WBC 14.6 08/01/2017 09:35 PM    RBC 4.39 08/01/2017 09:35 PM    HGB 12.6 08/01/2017 09:35 PM    HCT 40.1 08/01/2017 09:35 PM    PLATELET 597 85/85/8217 09:35 PM     BMP:   Lab Results   Component Value Date/Time    Glucose 93 08/01/2017 09:35 PM    Sodium 143 08/01/2017 09:35 PM    Potassium 3.5 08/01/2017 09:35 PM    Chloride 111 08/01/2017 09:35 PM    CO2 23 08/01/2017 09:35 PM    BUN 9 08/01/2017 09:35 PM    Creatinine 0.80 08/01/2017 09:35 PM    Calcium 8.1 08/01/2017 09:35 PM     CMP:   Lab Results   Component Value Date/Time    Glucose 93 08/01/2017 09:35 PM    Sodium 143 08/01/2017 09:35 PM    Potassium 3.5 08/01/2017 09:35 PM    Chloride 111 08/01/2017 09:35 PM    CO2 23 08/01/2017 09:35 PM    BUN 9 08/01/2017 09:35 PM    Creatinine 0.80 08/01/2017 09:35 PM    Calcium 8.1 08/01/2017 09:35 PM    Anion gap 9 08/01/2017 09:35 PM    BUN/Creatinine ratio 11 08/01/2017 09:35 PM    Alk. phosphatase 83 08/01/2017 09:35 PM    Protein, total 7.0 08/01/2017 09:35 PM    Albumin 3.5 08/01/2017 09:35 PM    Globulin 3.5 08/01/2017 09:35 PM    A-G Ratio 1.0 08/01/2017 09:35 PM     Coagulation:   Lab Results   Component Value Date/Time    Prothrombin time 13.4 09/23/2016 12:18 PM    INR 1.1 09/23/2016 12:18 PM     Cardiac markers:   Lab Results   Component Value Date/Time    CK 75 08/01/2017 09:35 PM    CK-MB Index  08/01/2017 09:35 PM     CALCULATION NOT PERFORMED WHEN RESULT IS BELOW LINEAR LIMIT     Assessment:  Chronic migraine, some compliance issues.   Sounds like the Topamax was working, but now with significant worsening of her headache after fall at home. Has a normal CT and MRI scan. Symptoms now resolved. Plan:   Continue current medications and see back here in about 3 months. Sincerely,        Siva Estrada.  Kari Morales M.D.

## 2018-01-29 NOTE — PROGRESS NOTES
1. Have you been to the ER, urgent care clinic since your last visit? Hospitalized since your last visit? Yes- fell in bath tub and sustained a concussion    2. Have you seen or consulted any other health care providers outside of the [address] [place] [address] since your last visit? Include any pap smears or colon screening.  No     Chief Complaint   Patient presents with    Follow-up    Migraine

## 2018-01-29 NOTE — LETTER
1/29/2018 1:47 PM 
 
Patient:  Inder Andersen YOB: 1975 Date of Visit: 1/29/2018 Dear Mikki Bay NP 
[address] [address] VIA In Basket 
 : Thank you for referring Ms. Catalina Blankenship to me for evaluation/treatment. Below are the relevant portions of my assessment and plan of care. Re:  Dylan Bah up visit     1/29/2018 1:40 PM 
 
 
 
 
SSN: [id number] Subjective:   Inder Andersen returns for follow up of her migraines. The headaches seem to be better since starting the Pamelor. She seems to be sleeping better. She was doing better until she developed the flu in August and got dizzy and struck her head on the bathtub. She gets headache daily since the head trauma. They are mostly right frontoparietal.  This is where she struck her head. June Glee Medications:   
Current Outpatient Prescriptions Medication Sig Dispense Refill  calcium carbonate (CALCIUM 500) 500 mg calcium (1,250 mg) tablet Take 1 Tab by mouth daily. 30 Tab 5  permethrin (LICE KILLING, PERMETHRIN,) 1 % lotion follow package directions 1 Bottle 0  
 albuterol (PROVENTIL HFA, VENTOLIN HFA, PROAIR HFA) 90 mcg/actuation inhaler Take 1 Puff by inhalation every four (4) hours as needed for Wheezing. 1 Inhaler 2  
 ondansetron (ZOFRAN ODT) 4 mg disintegrating tablet Take 1 Tab by mouth every eight (8) hours as needed for Nausea. 15 Tab 0  ADVAIR DISKUS 250-50 mcg/dose diskus inhaler INHALE ONE DOSE BY MOUTH TWICE DAILY 2 Inhaler 0  
 simvastatin (ZOCOR) 20 mg tablet TAKE ONE TABLET BY MOUTH NIGHTLY 30 Tab 5  pantoprazole (PROTONIX) 40 mg tablet TAKE ONE TABLET BY MOUTH ONCE DAILY 30 Tab 5  
 HYDROCODONE/ACETAMINOPHEN (LORTAB  PO) Take  by mouth.  topiramate (TOPAMAX) 200 mg tablet TAKE ONE TABLET BY MOUTH TWICE DAILY  Indications: MIGRAINE PREVENTION 60 Tab 6  nortriptyline (PAMELOR) 75 mg capsule Take 1 Cap by mouth nightly. 30 Cap 6  
 methocarbamol (ROBAXIN) 500 mg tablet Take 1 Tab by mouth four (4) times daily as needed. (Patient taking differently: Take 750 mg by mouth four (4) times daily as needed.) 30 Tab 0  
 butalbital-acetaminophen-caffeine (FIORICET, ESGIC) -40 mg per tablet Take 1 Tab by mouth every six (6) hours as needed for Pain or Headache. 30 Tab 0  
 lamoTRIgine (LAMICTAL) 25 mg tablet Take 50 mg by mouth nightly.  clonazePAM (KLONOPIN) 0.5 mg tablet Take  by mouth nightly as needed.  docusate sodium (COLACE) 100 mg capsule Take 1 Cap by mouth two (2) times a day. 60 Cap 0  
 venlafaxine-SR (EFFEXOR-XR) 150 mg capsule Take 150 mg by mouth daily. Vital signs:   
Visit Vitals  /64 (BP 1 Location: Left arm, BP Patient Position: Sitting)  Pulse 84  Temp 98.5 °F (36.9 °C) (Oral)  Resp 14  
 Ht 5' 5\" (1.651 m)  Wt 84.4 kg (186 lb)  LMP 09/28/2016 (LMP Unknown)  SpO2 98%  BMI 30.95 kg/m2 Review of Systems: As above otherwise 11 point review of systems negative including;  
Constitutional no fever or chills Skin denies rash or itching HEENT  Denies tinnitus, hearing lose Eyes denies diplopia vision lose Respiratory denies sortness of breath Cardiovascular denies chest pain, dyspnea on exertion Gastrointestinal denies nausea, vomiting, diarrhea, constipation Genitourinary denies incontinence Musculoskeletal denies joint pain or swelling Endocrine denies weight change Hematology denies easy bruising or bleeding Neurological as above in HPI Patient Active Problem List  
Diagnosis Code  Asthma J45.909  Migraine G43.909  Hypotension I95.9  Fibromyalgia M79.7  Bipolar depression (HCC) F31.30  
 History of stroke Z86.73  
 Gastroesophageal reflux disease with esophagitis K21.0  Hyperlipidemia E78.5  Chronic constipation K59.09  
 Anemia D64.9  Chronic obstructive pulmonary disease with acute exacerbation ([facility]) J44.1  Cigarette smoker F17.210  Left-sided weakness R53.1 Objective: The patient is awake, alert, and oriented x 4. Fund of knowledge is adequate. Speech is fluent and memory is intact. Cranial Nerves: II  Visual fields are full to confrontation. III, IV, VI  Extraocular movements are intact. There is no nystagmus. V  Facial sensation is intact to pinprick. VII  Face is asymmetrical, she has decreased movement of the left face, but at rest the face is symmetrical.  VIII - Hearing is present. IX, X, XII  Palate is symmetrical.   XI - Shoulder shrugging and head turning intact Motor: The patient has 5/5 strength in all 4 limbs. Sensation is intact to pinprick. Reflexes are 2+ and symmetrical.  Gait is normal. 
 
CBC:  
Lab Results Component Value Date/Time WBC 14.6 08/01/2017 09:35 PM  
 RBC 4.39 08/01/2017 09:35 PM  
 HGB 12.6 08/01/2017 09:35 PM  
 HCT 40.1 08/01/2017 09:35 PM  
 PLATELET 432 08/40/3898 09:35 PM  
 
BMP:  
Lab Results Component Value Date/Time Glucose 93 08/01/2017 09:35 PM  
 Sodium 143 08/01/2017 09:35 PM  
 Potassium 3.5 08/01/2017 09:35 PM  
 Chloride 111 08/01/2017 09:35 PM  
 CO2 23 08/01/2017 09:35 PM  
 BUN 9 08/01/2017 09:35 PM  
 Creatinine 0.80 08/01/2017 09:35 PM  
 Calcium 8.1 08/01/2017 09:35 PM  
 
CMP:  
Lab Results Component Value Date/Time Glucose 93 08/01/2017 09:35 PM  
 Sodium 143 08/01/2017 09:35 PM  
 Potassium 3.5 08/01/2017 09:35 PM  
 Chloride 111 08/01/2017 09:35 PM  
 CO2 23 08/01/2017 09:35 PM  
 BUN 9 08/01/2017 09:35 PM  
 Creatinine 0.80 08/01/2017 09:35 PM  
 Calcium 8.1 08/01/2017 09:35 PM  
 Anion gap 9 08/01/2017 09:35 PM  
 BUN/Creatinine ratio 11 08/01/2017 09:35 PM  
 Alk.  phosphatase 83 08/01/2017 09:35 PM  
 Protein, total 7.0 08/01/2017 09:35 PM  
 Albumin 3.5 08/01/2017 09:35 PM  
 Globulin 3.5 08/01/2017 09:35 PM  
 A-G Ratio 1.0 08/01/2017 09:35 PM  
 
Coagulation:  
Lab Results Component Value Date/Time Prothrombin time 13.4 09/23/2016 12:18 PM  
 INR 1.1 09/23/2016 12:18 PM  
 
Cardiac markers:  
Lab Results Component Value Date/Time CK 75 08/01/2017 09:35 PM  
 CK-MB Index  08/01/2017 09:35 PM  
  CALCULATION NOT PERFORMED WHEN RESULT IS BELOW LINEAR LIMIT Assessment:  Chronic migraine, some compliance issues. Sounds like the Topamax was working, but now with significant worsening of her headache after fall at home. Has a normal CT and MRI scan. Symptoms now resolved. Plan:   Continue current medications and see back here in about 3 months. Sincerely, 
 
 
 
Venkatesh Mater. Talya Webber M.D. If you have questions, please do not hesitate to call me. I look forward to following Ms. Carol Lou along with you.  
 
 
 
Sincerely, 
 
 
Rey Izaguirre MD

## 2018-01-29 NOTE — COMMUNICATION BODY
Re:  Dola Primrose up visit     1/29/2018 1:40 PM          SSN: [id number]    Subjective:   Moira Cole returns for follow up of her migraines. The headaches seem to be better since starting the Pamelor. She seems to be sleeping better. She was doing better until she developed the flu in August and got dizzy and struck her head on the bathtub. She gets headache daily since the head trauma. They are mostly right frontoparietal.  This is where she struck her head. .                   Medications:    Current Outpatient Prescriptions   Medication Sig Dispense Refill    calcium carbonate (CALCIUM 500) 500 mg calcium (1,250 mg) tablet Take 1 Tab by mouth daily. 30 Tab 5    permethrin (LICE KILLING, PERMETHRIN,) 1 % lotion follow package directions 1 Bottle 0    albuterol (PROVENTIL HFA, VENTOLIN HFA, PROAIR HFA) 90 mcg/actuation inhaler Take 1 Puff by inhalation every four (4) hours as needed for Wheezing. 1 Inhaler 2    ondansetron (ZOFRAN ODT) 4 mg disintegrating tablet Take 1 Tab by mouth every eight (8) hours as needed for Nausea. 15 Tab 0    ADVAIR DISKUS 250-50 mcg/dose diskus inhaler INHALE ONE DOSE BY MOUTH TWICE DAILY 2 Inhaler 0    simvastatin (ZOCOR) 20 mg tablet TAKE ONE TABLET BY MOUTH NIGHTLY 30 Tab 5    pantoprazole (PROTONIX) 40 mg tablet TAKE ONE TABLET BY MOUTH ONCE DAILY 30 Tab 5    HYDROCODONE/ACETAMINOPHEN (LORTAB  PO) Take  by mouth.  topiramate (TOPAMAX) 200 mg tablet TAKE ONE TABLET BY MOUTH TWICE DAILY  Indications: MIGRAINE PREVENTION 60 Tab 6    nortriptyline (PAMELOR) 75 mg capsule Take 1 Cap by mouth nightly. 30 Cap 6    methocarbamol (ROBAXIN) 500 mg tablet Take 1 Tab by mouth four (4) times daily as needed.  (Patient taking differently: Take 750 mg by mouth four (4) times daily as needed.) 30 Tab 0    butalbital-acetaminophen-caffeine (FIORICET, ESGIC) -40 mg per tablet Take 1 Tab by mouth every six (6) hours as needed for Pain or Headache. 30 Tab 0    lamoTRIgine (LAMICTAL) 25 mg tablet Take 50 mg by mouth nightly.  clonazePAM (KLONOPIN) 0.5 mg tablet Take  by mouth nightly as needed.  docusate sodium (COLACE) 100 mg capsule Take 1 Cap by mouth two (2) times a day. 60 Cap 0    venlafaxine-SR (EFFEXOR-XR) 150 mg capsule Take 150 mg by mouth daily. Vital signs:    Visit Vitals    /64 (BP 1 Location: Left arm, BP Patient Position: Sitting)    Pulse 84    Temp 98.5 °F (36.9 °C) (Oral)    Resp 14    Ht 5' 5\" (1.651 m)    Wt 84.4 kg (186 lb)    LMP 09/28/2016 (LMP Unknown)    SpO2 98%    BMI 30.95 kg/m2       Review of Systems:   As above otherwise 11 point review of systems negative including;   Constitutional no fever or chills  Skin denies rash or itching  HEENT  Denies tinnitus, hearing lose  Eyes denies diplopia vision lose  Respiratory denies sortness of breath  Cardiovascular denies chest pain, dyspnea on exertion  Gastrointestinal denies nausea, vomiting, diarrhea, constipation  Genitourinary denies incontinence  Musculoskeletal denies joint pain or swelling  Endocrine denies weight change  Hematology denies easy bruising or bleeding   Neurological as above in HPI      Patient Active Problem List   Diagnosis Code    Asthma J45.909    Migraine G43.909    Hypotension I95.9    Fibromyalgia M79.7    Bipolar depression ([facility] [address] F31.30    History of stroke Z86.73    Gastroesophageal reflux disease with esophagitis K21.0    Hyperlipidemia E78.5    Chronic constipation K59.09    Anemia D64.9    Chronic obstructive pulmonary disease with acute exacerbation (HCC) J44.1    Cigarette smoker F17.210    Left-sided weakness R53.1         Objective: The patient is awake, alert, and oriented x 4. Fund of knowledge is adequate. Speech is fluent and memory is intact. Cranial Nerves: II - Visual fields are full to confrontation. III, IV, VI - Extraocular movements are intact. There is no nystagmus.  V - Facial sensation is intact to pinprick. VII - Face is asymmetrical, she has decreased movement of the left face, but at rest the face is symmetrical.  VIII - Hearing is present. IX, X, XII - Palate is symmetrical.   XI - Shoulder shrugging and head turning intact  Motor: The patient has 5/5 strength in all 4 limbs. Sensation is intact to pinprick. Reflexes are 2+ and symmetrical.  Gait is normal.    CBC:   Lab Results   Component Value Date/Time    WBC 14.6 08/01/2017 09:35 PM    RBC 4.39 08/01/2017 09:35 PM    HGB 12.6 08/01/2017 09:35 PM    HCT 40.1 08/01/2017 09:35 PM    PLATELET 943 14/38/5420 09:35 PM     BMP:   Lab Results   Component Value Date/Time    Glucose 93 08/01/2017 09:35 PM    Sodium 143 08/01/2017 09:35 PM    Potassium 3.5 08/01/2017 09:35 PM    Chloride 111 08/01/2017 09:35 PM    CO2 23 08/01/2017 09:35 PM    BUN 9 08/01/2017 09:35 PM    Creatinine 0.80 08/01/2017 09:35 PM    Calcium 8.1 08/01/2017 09:35 PM     CMP:   Lab Results   Component Value Date/Time    Glucose 93 08/01/2017 09:35 PM    Sodium 143 08/01/2017 09:35 PM    Potassium 3.5 08/01/2017 09:35 PM    Chloride 111 08/01/2017 09:35 PM    CO2 23 08/01/2017 09:35 PM    BUN 9 08/01/2017 09:35 PM    Creatinine 0.80 08/01/2017 09:35 PM    Calcium 8.1 08/01/2017 09:35 PM    Anion gap 9 08/01/2017 09:35 PM    BUN/Creatinine ratio 11 08/01/2017 09:35 PM    Alk. phosphatase 83 08/01/2017 09:35 PM    Protein, total 7.0 08/01/2017 09:35 PM    Albumin 3.5 08/01/2017 09:35 PM    Globulin 3.5 08/01/2017 09:35 PM    A-G Ratio 1.0 08/01/2017 09:35 PM     Coagulation:   Lab Results   Component Value Date/Time    Prothrombin time 13.4 09/23/2016 12:18 PM    INR 1.1 09/23/2016 12:18 PM     Cardiac markers:   Lab Results   Component Value Date/Time    CK 75 08/01/2017 09:35 PM    CK-MB Index  08/01/2017 09:35 PM     CALCULATION NOT PERFORMED WHEN RESULT IS BELOW LINEAR LIMIT     Assessment:  Chronic migraine, some compliance issues.   Sounds like the Topamax was working, but now with significant worsening of her headache after fall at home. Has a normal CT and MRI scan. Symptoms now resolved. Plan:   Continue current medications and see back here in about 3 months. Sincerely,        Edith Macias.  Nona Wu M.D.

## 2018-02-09 ENCOUNTER — OFFICE VISIT (OUTPATIENT)
Dept: FAMILY MEDICINE CLINIC | Age: 43
End: 2018-02-09

## 2018-02-09 VITALS
SYSTOLIC BLOOD PRESSURE: 106 MMHG | DIASTOLIC BLOOD PRESSURE: 71 MMHG | HEART RATE: 93 BPM | RESPIRATION RATE: 16 BRPM | OXYGEN SATURATION: 96 % | WEIGHT: 186.6 LBS | BODY MASS INDEX: 31.09 KG/M2 | HEIGHT: 65 IN | TEMPERATURE: 99 F

## 2018-02-09 DIAGNOSIS — F31.9 BIPOLAR DEPRESSION (HCC): ICD-10-CM

## 2018-02-09 DIAGNOSIS — F17.210 CIGARETTE SMOKER: ICD-10-CM

## 2018-02-09 DIAGNOSIS — R06.2 WHEEZING: ICD-10-CM

## 2018-02-09 DIAGNOSIS — J44.9 CHRONIC OBSTRUCTIVE PULMONARY DISEASE, UNSPECIFIED COPD TYPE (HCC): ICD-10-CM

## 2018-02-09 DIAGNOSIS — E78.2 MIXED HYPERLIPIDEMIA: ICD-10-CM

## 2018-02-09 DIAGNOSIS — Z23 ENCOUNTER FOR IMMUNIZATION: ICD-10-CM

## 2018-02-09 DIAGNOSIS — I95.0 IDIOPATHIC HYPOTENSION: ICD-10-CM

## 2018-02-09 DIAGNOSIS — G43.009 MIGRAINE WITHOUT AURA AND WITHOUT STATUS MIGRAINOSUS, NOT INTRACTABLE: Primary | ICD-10-CM

## 2018-02-09 DIAGNOSIS — R11.0 NAUSEA: ICD-10-CM

## 2018-02-09 PROBLEM — R53.1 LEFT-SIDED WEAKNESS: Status: RESOLVED | Noted: 2017-02-24 | Resolved: 2018-02-09

## 2018-02-09 RX ORDER — ONDANSETRON 4 MG/1
4 TABLET, ORALLY DISINTEGRATING ORAL
Qty: 15 TAB | Refills: 0 | Status: SHIPPED | OUTPATIENT
Start: 2018-02-09

## 2018-02-09 RX ORDER — AZITHROMYCIN 250 MG/1
TABLET, FILM COATED ORAL
Qty: 6 TAB | Refills: 0 | Status: SHIPPED | OUTPATIENT
Start: 2018-02-09 | End: 2018-02-14

## 2018-02-09 NOTE — PATIENT INSTRUCTIONS
Learning About Benefits From Quitting Smoking  How does quitting smoking make you healthier? If you're thinking about quitting smoking, you may have a few reasons to be smoke-free. Your health may be one of them. · When you quit smoking, you lower your risks for cancer, lung disease, heart attack, stroke, blood vessel disease, and blindness from macular degeneration. · When you're smoke-free, you get sick less often, and you heal faster. You are less likely to get colds, flu, bronchitis, and pneumonia. · As a nonsmoker, you may find that your mood is better and you are less stressed. When and how will you feel healthier? Quitting has real health benefits that start from day 1 of being smoke-free. And the longer you stay smoke-free, the healthier you get and the better you feel. The first hours  · After just 20 minutes, your blood pressure and heart rate go down. That means there's less stress on your heart and blood vessels. · Within 12 hours, the level of carbon monoxide in your blood drops back to normal. That makes room for more oxygen. With more oxygen in your body, you may notice that you have more energy than when you smoked. After 2 weeks  · Your lungs start to work better. · Your risk of heart attack starts to drop. After 1 month  · When your lungs are clear, you cough less and breathe deeper, so it's easier to be active. · Your sense of taste and smell return. That means you can enjoy food more than you have since you started smoking. Over the years  · After 1 year, your risk of heart disease is half what it would be if you kept smoking. · After 5 years, your risk of stroke starts to shrink. Within a few years after that, it's about the same as if you'd never smoked. · After 10 years, your risk of dying from lung cancer is cut by about half. And your risk for many other types of cancer is lower too. How would quitting help others in your life?   When you quit smoking, you improve the health of everyone who now breathes in your smoke. · Their heart, lung, and cancer risks drop, much like yours. · They are sick less. For babies and small children, living smoke-free means they're less likely to have ear infections, pneumonia, and bronchitis. · If you're a woman who is or will be pregnant someday, quitting smoking means a healthier . · Children who are close to you are less likely to become adult smokers. Where can you learn more? Go to http://romario-clover.info/. Enter [id number] [id number] in the search box to learn more about \"Learning About Benefits From Quitting Smoking. \"  Current as of: 2017  Content Version: 11.4  © 0345-4671 [employer]. Care instructions adapted under license by [employer] (which disclaims liability or warranty for this information). If you have questions about a medical condition or this instruction, always ask your healthcare professional. [place] [address] any warranty or liability for your use of this information. Low Blood Pressure: Care Instructions  Your Care Instructions    Blood pressure is a measurement of the force of the blood against the walls of the blood vessels during and after each beat of the heart. Low blood pressure is also called hypotension. It means that your blood pressure is much lower than normal. Some people, especially young, slim women, may have slightly low blood pressure without symptoms. But in many people, low blood pressure can cause symptoms such as feeling dizzy or lightheaded. When your blood pressure is too low, your heart, brain, and other organs do not get enough blood. Low blood pressure can be caused by many things, including heart problems and some medicines. Diabetes that is not under control can cause your blood pressure to drop. And so can a severe allergic reaction or infection.  Another cause is dehydration, which is when your body loses too much fluid.  Treatment for low blood pressure depends on the cause. Follow-up care is a key part of your treatment and safety. Be sure to make and go to all appointments, and call your doctor if you are having problems. It's also a good idea to know your test results and keep a list of the medicines you take. How can you care for yourself at home? · Drink plenty of fluids, enough so that your urine is light yellow or clear like water. If you have kidney, heart, or liver disease and have to limit fluids, talk with your doctor before you increase the amount of fluids you drink. · Be safe with medicines. Call your doctor if you think you are having a problem with your medicine. You will get more details on the specific medicines your doctor prescribes. · Stand up or get out of bed very slowly to allow your body to adjust.  · Get plenty of rest.  · Do not smoke. Smoking increases your risk of heart attack. If you need help quitting, talk to your doctor about stop-smoking programs and medicines. These can increase your chances of quitting for good. · Limit alcohol to 2 drinks a day for men and 1 drink a day for women. Alcohol may interfere with your medicine. In addition, alcohol can make your low blood pressure worse by causing your body to lose water. When should you call for help? Call 911 anytime you think you may need emergency care. For example, call if:  ? · You passed out (lost consciousness). ?Call your doctor now or seek immediate medical care if:  ? · You are dizzy or lightheaded, or you feel like you may faint. ? Watch closely for changes in your health, and be sure to contact your doctor if you have any problems. Where can you learn more? Go to http://romario-clover.info/. Enter C304 in the search box to learn more about \"Low Blood Pressure: Care Instructions. \"  Current as of: September 21, 2016  Content Version: 11.4  © 3834-5979 [employer].  Care instructions adapted under license by "[employer]" (which disclaims liability or warranty for this information). If you have questions about a medical condition or this instruction, always ask your healthcare professional. [address] any warranty or liability for your use of this information. Vaccine Information Statement    Influenza (Flu) Vaccine (Inactivated or Recombinant): What you need to know    Many Vaccine Information Statements are available in Somali and other languages. See www.immunize.org/vis  Hojas de Información Sobre Vacunas están disponibles en Español y en muchos otros idiomas. Visite www.immunize.org/vis    1. Why get vaccinated? Influenza (flu) is a contagious disease that spreads around the [facility] every year, usually between October and May. Flu is caused by influenza viruses, and is spread mainly by coughing, sneezing, and close contact. Anyone can get flu. Flu strikes suddenly and can last several days. Symptoms vary by age, but can include:   fever/chills   sore throat   muscle aches   fatigue   cough   headache    runny or stuffy nose    Flu can also lead to pneumonia and blood infections, and cause diarrhea and seizures in children. If you have a medical condition, such as heart or lung disease, flu can make it worse. Flu is more dangerous for some people. Infants and young children, people 72years of age and older, pregnant women, and people with certain health conditions or a weakened immune system are at greatest risk. Each year thousands of people in the [facility] die from flu, and many more are hospitalized. Flu vaccine can:   keep you from getting flu,   make flu less severe if you do get it, and   keep you from spreading flu to your family and other people. 2. Inactivated and recombinant flu vaccines    A dose of flu vaccine is recommended every flu season.  Children 6 months through 6years of age may need two doses during the same flu season. Everyone else needs only one dose each flu season. Some inactivated flu vaccines contain a very small amount of a mercury-based preservative called thimerosal. Studies have not shown thimerosal in vaccines to be harmful, but flu vaccines that do not contain thimerosal are available. There is no live flu virus in flu shots. They cannot cause the flu. There are many flu viruses, and they are always changing. Each year a new flu vaccine is made to protect against three or four viruses that are likely to cause disease in the upcoming flu season. But even when the vaccine doesnt exactly match these viruses, it may still provide some protection    Flu vaccine cannot prevent:   flu that is caused by a virus not covered by the vaccine, or   illnesses that look like flu but are not. It takes about 2 weeks for protection to develop after vaccination, and protection lasts through the flu season. 3. Some people should not get this vaccine    Tell the person who is giving you the vaccine:     If you have any severe, life-threatening allergies. If you ever had a life-threatening allergic reaction after a dose of flu vaccine, or have a severe allergy to any part of this vaccine, you may be advised not to get vaccinated. Most, but not all, types of flu vaccine contain a small amount of egg protein.  If you ever had Guillain-Barré Syndrome (also called GBS). Some people with a history of GBS should not get this vaccine. This should be discussed with your doctor.  If you are not feeling well. It is usually okay to get flu vaccine when you have a mild illness, but you might be asked to come back when you feel better. 4. Risks of a vaccine reaction    With any medicine, including vaccines, there is a chance of reactions. These are usually mild and go away on their own, but serious reactions are also possible.      Most people who get a flu shot do not have any problems with it. Minor problems following a flu shot include:    soreness, redness, or swelling where the shot was given     hoarseness   sore, red or itchy eyes   cough   fever   aches   headache   itching   fatigue  If these problems occur, they usually begin soon after the shot and last 1 or 2 days. More serious problems following a flu shot can include the following:     There may be a small increased risk of Guillain-Barré Syndrome (GBS) after inactivated flu vaccine. This risk has been estimated at 1 or 2 additional cases per million people vaccinated. This is much lower than the risk of severe complications from flu, which can be prevented by flu vaccine.  Young children who get the flu shot along with pneumococcal vaccine (PCV13) and/or DTaP vaccine at the same time might be slightly more likely to have a seizure caused by fever. Ask your doctor for more information. Tell your doctor if a child who is getting flu vaccine has ever had a seizure. Problems that could happen after any injected vaccine:      People sometimes faint after a medical procedure, including vaccination. Sitting or lying down for about 15 minutes can help prevent fainting, and injuries caused by a fall. Tell your doctor if you feel dizzy, or have vision changes or ringing in the ears.  Some people get severe pain in the shoulder and have difficulty moving the arm where a shot was given. This happens very rarely.  Any medication can cause a severe allergic reaction. Such reactions from a vaccine are very rare, estimated at about 1 in a million doses, and would happen within a few minutes to a few hours after the vaccination. As with any medicine, there is a very remote chance of a vaccine causing a serious injury or death. The safety of vaccines is always being monitored. For more information, visit: www.cdc.gov/vaccinesafety/    5. What if there is a serious reaction?     What should I look for?  Look for anything that concerns you, such as signs of a severe allergic reaction, very high fever, or unusual behavior. Signs of a severe allergic reaction can include hives, swelling of the face and throat, difficulty breathing, a fast heartbeat, dizziness, and weakness - usually within a few minutes to a few hours after the vaccination. What should I do?  If you think it is a severe allergic reaction or other emergency that cant wait, call 9-1-1 and get the person to the nearest hospital. Otherwise, call your doctor.  Reactions should be reported to the Vaccine Adverse Event Reporting System (VAERS). Your doctor should file this report, or you can do it yourself through  the VAERS web site at www.vaers. hhs.gov, or by calling 7-384.836.7754. VAERS does not give medical advice. 6. The National Vaccine Injury Compensation Program    The [facility] Vaccine Injury Compensation Program (VICP) is a federal program that was created to compensate people who may have been injured by certain vaccines. Persons who believe they may have been injured by a vaccine can learn about the program and about filing a claim by calling 5-847.107.2577 or visiting the [address] [place] [address] [address] website at www.[facility].gov/vaccinecompensation. There is a time limit to file a claim for compensation. 7. How can I learn more?  Ask your healthcare provider. He or she can give you the vaccine package insert or suggest other sources of information.  Call your local or state health department.  Contact the Centers for Disease Control and Prevention (CDC):  - Call 9-893.958.5609 (1-800-CDC-INFO) or  - Visit CDCs website at www.cdc.gov/flu    Vaccine Information Statement   Inactivated Influenza Vaccine   8/7/2015  [address] [place] 303BR-03    Department of Health and Human Services  Centers for Disease Control and Prevention    Office Use Only

## 2018-02-09 NOTE — MR AVS SNAPSHOT
Katelyn Mar 
 
 
 [address] [facility] [address] [address] 
[phone] Patient: Olayinka Baltazar MRN: [id number] :1975 Visit Information Date & Time Provider Department Dept. Phone Encounter #  
 2018 11:00 AM [place] M[place] [id number] [address] [phone] Follow-up Instructions Return in about 6 months (around 2018), or if symptoms worsen or fail to improve, for routine follow up . Your Appointments 2018  1:40 PM  
Follow Up with Meera Ross MD  
[facility] [address] [facility]) Appt Note: 3mon f/u  
 [address] [place] [address] [phone]  
  
   
 [facility] [address] Upcoming Health Maintenance Date Due  
 PAP AKA CERVICAL CYTOLOGY 2019 DTaP/Tdap/Td series (2 - Td) 2027 Allergies as of 2018  Review Complete On: 2018 By: Dipesh Mane NP Severity Noted Reaction Type Reactions Relpax [Eletriptan Hbr] High 2013    Anaphylaxis Relpax [Eletriptan Hbr]  2014    Sneezing Ultram [Tramadol]  2013    Nausea Only Ultram [Tramadol]  2014    Nausea and Vomiting Current Immunizations  Reviewed on 10/12/2016 Name Date Influenza Vaccine (Quad) PF 2018 12:01 PM  
 Pneumococcal Polysaccharide (PPSV-23) 2017 Td, Adsorbed PF 2015  1:25 PM  
  
 Not reviewed this visit You Were Diagnosed With   
  
 Codes Comments Migraine without aura and without status migrainosus, not intractable    -  Primary ICD-10-CM: G43.009 ICD-9-CM: 346.10 Idiopathic hypotension     ICD-10-CM: I95.0 ICD-9-CM: 131. 9 Cigarette smoker     ICD-10-CM: F17.210 ICD-9-CM: 305.1 Bipolar depression ([facility] [address]     ICD-10-CM: F31.30 ICD-9-CM: 296.50 Chronic obstructive pulmonary disease, unspecified COPD type ([facility] [address]     ICD-10-CM: J44.9 ICD-9-CM: 247 Mixed hyperlipidemia     ICD-10-CM: E78.2 ICD-9-CM: 272.2 Nausea     ICD-10-CM: R11.0 ICD-9-CM: 787.02 Wheezing     ICD-10-CM: R06.2 ICD-9-CM: 786.07 Encounter for immunization     ICD-10-CM: W14 ICD-9-CM: V03.89 Vitals BP Pulse Temp Resp Height(growth percentile) Weight(growth percentile) 106/71 (BP 1 Location: Right arm, BP Patient Position: Sitting) 93 99 °F (37.2 °C) (Oral) 16 5' 5\" (1.651 m) 186 lb 9.6 oz (84.6 kg) LMP SpO2 BMI OB Status Smoking Status 09/28/2016 (LMP Unknown) 96% 31.05 kg/m2 Hysterectomy Current Every Day Smoker BMI and BSA Data Body Mass Index Body Surface Area 31.05 kg/m 2 1.97 m 2 Preferred Pharmacy Pharmacy Name Phone [facility] [address], [address] [facility] [phone] Your Updated Medication List  
  
   
This list is accurate as of: 2/9/18 12:02 PM.  Always use your most recent med list.  
  
  
  
  
 Rae Care 250-50 mcg/dose diskus inhaler Generic drug:  fluticasone-salmeterol INHALE ONE DOSE BY MOUTH TWICE DAILY  
  
 albuterol 90 mcg/actuation inhaler Commonly known as:  PROVENTIL HFA, VENTOLIN HFA, PROAIR HFA Take 1 Puff by inhalation every four (4) hours as needed for Wheezing. azithromycin 250 mg tablet Commonly known as:  Chucky Simmonds Take 2 tablets today, then take 1 tablet daily  
  
 butalbital-acetaminophen-caffeine -40 mg per tablet Commonly known as:  [place] Dorothy Take 1 Tab by mouth every six (6) hours as needed for Pain or Headache.  
  
 calcium carbonate 500 mg calcium (1,250 mg) tablet Commonly known as:  CALCIUM 500 Take 1 Tab by mouth daily. docusate sodium 100 mg capsule Commonly known as:  Montey Roch Take 1 Cap by mouth two (2) times a day. KlonoPIN 0.5 mg tablet Generic drug:  clonazePAM  
Take  by mouth nightly as needed. LaMICtal 25 mg tablet Generic drug:  lamoTRIgine Take 50 mg by mouth nightly. LORTAB  PO Take  by mouth. methocarbamol 500 mg tablet Commonly known as:  ROBAXIN Take 1 Tab by mouth four (4) times daily as needed. nortriptyline 50 mg capsule Commonly known as:  PAMELOR Take 2 Caps by mouth nightly. ondansetron 4 mg disintegrating tablet Commonly known as:  ZOFRAN ODT Take 1 Tab by mouth every eight (8) hours as needed for Nausea. pantoprazole 40 mg tablet Commonly known as:  PROTONIX  
TAKE ONE TABLET BY MOUTH ONCE DAILY  
  
 simvastatin 20 mg tablet Commonly known as:  ZOCOR  
TAKE ONE TABLET BY MOUTH NIGHTLY  
  
 topiramate 200 mg tablet Commonly known as:  TOPAMAX TAKE ONE TABLET BY MOUTH TWICE DAILY  Indications: MIGRAINE PREVENTION  
  
 venlafaxine- mg capsule Commonly known as:  EFFEXOR-XR Take 150 mg by mouth daily. Prescriptions Sent to Pharmacy Refills  
 ondansetron (ZOFRAN ODT) 4 mg disintegrating tablet 0 Sig: Take 1 Tab by mouth every eight (8) hours as needed for Nausea. Class: Normal  
 Pharmacy: [facility] [address], [address] Ph #: [phone] Route: Oral  
 azithromycin (ZITHROMAX) 250 mg tablet 0 Sig: Take 2 tablets today, then take 1 tablet daily Class: Normal  
 Pharmacy: [facility] [address], [address] Ph #: [phone] We Performed the Following AMB SUPPLY ORDER [[id number] Custom] Comments:  
 Home BP cuff INFLUENZA VIRUS VAC QUAD,SPLIT,PRESV FREE SYRINGE IM [id number] CPT(R)] LA SMOKING AND TOBACCO USE CESSATION 3 - 10 MINUTES [34994 CPT(R)] Follow-up Instructions Return in about 6 months (around 8/9/2018), or if symptoms worsen or fail to improve, for routine follow up . To-Do List   
 02/09/2018 Lab:  CBC W/O DIFF   
  
 02/09/2018 Lab:  LIPID PANEL   
  
 02/09/2018   Lab:  METABOLIC PANEL, COMPREHENSIVE   
  
 06/20/2018 9:30 AM  
 Appointment with [facility] CRISTINA CHEMO [address] RM 1 at [facility] ([phone]) OUTSIDE FILMS  - Any outside films related to the study being scheduled should be brought with you on the day of the exam.  If this cannot be done there may be a delay in the reading of the study. MEDICATIONS  - Patient must bring a complete list of all medications currently taking to include prescriptions, over-the-counter meds, herbals, vitamins & any dietary supplements  GENERAL INSTRUCTIONS  - On the day of your exam do not use any bath powder, deodorant or lotions on the armpit area. Patient Instructions Learning About Benefits From Quitting Smoking How does quitting smoking make you healthier? If you're thinking about quitting smoking, you may have a few reasons to be smoke-free. Your health may be one of them. · When you quit smoking, you lower your risks for cancer, lung disease, heart attack, stroke, blood vessel disease, and blindness from macular degeneration. · When you're smoke-free, you get sick less often, and you heal faster. You are less likely to get colds, flu, bronchitis, and pneumonia. · As a nonsmoker, you may find that your mood is better and you are less stressed. When and how will you feel healthier? Quitting has real health benefits that start from day 1 of being smoke-free. And the longer you stay smoke-free, the healthier you get and the better you feel. The first hours · After just 20 minutes, your blood pressure and heart rate go down. That means there's less stress on your heart and blood vessels. · Within 12 hours, the level of carbon monoxide in your blood drops back to normal. That makes room for more oxygen. With more oxygen in your body, you may notice that you have more energy than when you smoked. After 2 weeks · Your lungs start to work better. · Your risk of heart attack starts to drop. After 1 month · When your lungs are clear, you cough less and breathe deeper, so it's easier to be active. · Your sense of taste and smell return. That means you can enjoy food more than you have since you started smoking. Over the years · After 1 year, your risk of heart disease is half what it would be if you kept smoking. · After 5 years, your risk of stroke starts to shrink. Within a few years after that, it's about the same as if you'd never smoked. · After 10 years, your risk of dying from lung cancer is cut by about half. And your risk for many other types of cancer is lower too. How would quitting help others in your life? When you quit smoking, you improve the health of everyone who now breathes in your smoke. · Their heart, lung, and cancer risks drop, much like yours. · They are sick less. For babies and small children, living smoke-free means they're less likely to have ear infections, pneumonia, and bronchitis. · If you're a woman who is or will be pregnant someday, quitting smoking means a healthier . · Children who are close to you are less likely to become adult smokers. Where can you learn more? Go to http://romarioShanghai Woshi Cultural Transmissionclover.info/. Enter [id number] [id number] 11 in the search box to learn more about \"Learning About Benefits From Quitting Smoking. \" Current as of: 2017 Content Version: 11.4 © 1437-7110 3225 films. Care instructions adapted under license by Zin.gl (which disclaims liability or warranty for this information). If you have questions about a medical condition or this instruction, always ask your healthcare professional. [place] [address] any warranty or liability for your use of this information. Low Blood Pressure: Care Instructions Your Care Instructions Blood pressure is a measurement of the force of the blood against the walls of the blood vessels during and after each beat of the heart.  Low blood pressure is also called hypotension. It means that your blood pressure is much lower than normal. Some people, especially young, slim women, may have slightly low blood pressure without symptoms. But in many people, low blood pressure can cause symptoms such as feeling dizzy or lightheaded. When your blood pressure is too low, your heart, brain, and other organs do not get enough blood. Low blood pressure can be caused by many things, including heart problems and some medicines. Diabetes that is not under control can cause your blood pressure to drop. And so can a severe allergic reaction or infection. Another cause is dehydration, which is when your body loses too much fluid. Treatment for low blood pressure depends on the cause. Follow-up care is a key part of your treatment and safety. Be sure to make and go to all appointments, and call your doctor if you are having problems. It's also a good idea to know your test results and keep a list of the medicines you take. How can you care for yourself at home? · Drink plenty of fluids, enough so that your urine is light yellow or clear like water. If you have kidney, heart, or liver disease and have to limit fluids, talk with your doctor before you increase the amount of fluids you drink. · Be safe with medicines. Call your doctor if you think you are having a problem with your medicine. You will get more details on the specific medicines your doctor prescribes. · Stand up or get out of bed very slowly to allow your body to adjust. 
· Get plenty of rest. 
· Do not smoke. Smoking increases your risk of heart attack. If you need help quitting, talk to your doctor about stop-smoking programs and medicines. These can increase your chances of quitting for good. · Limit alcohol to 2 drinks a day for men and 1 drink a day for women. Alcohol may interfere with your medicine.  In addition, alcohol can make your low blood pressure worse by causing your body to lose water. When should you call for help? Call 911 anytime you think you may need emergency care. For example, call if: 
? · You passed out (lost consciousness). ?Call your doctor now or seek immediate medical care if: 
? · You are dizzy or lightheaded, or you feel like you may faint. ? Watch closely for changes in your health, and be sure to contact your doctor if you have any problems. Where can you learn more? Go to http://romario-clover.info/. Enter C304 in the search box to learn more about \"Low Blood Pressure: Care Instructions. \" Current as of: September 21, 2016 Content Version: 11.4 © 9151-7622 Social Pulse. Care instructions adapted under license by En Noir (which disclaims liability or warranty for this information). If you have questions about a medical condition or this instruction, always ask your healthcare professional. [place] [address] any warranty or liability for your use of this information. Vaccine Information Statement Influenza (Flu) Vaccine (Inactivated or Recombinant): What you need to know Many Vaccine Information Statements are available in [place]ese and other languages. See www.immunize.org/vis Hojas de Información Sobre Vacunas están disponibles en Español y en muchos otros idiomas. Visite www.immunize.org/vis 1. Why get vaccinated? Influenza (flu) is a contagious disease that spreads around the [place] every year, usually between October and May. Flu is caused by influenza viruses, and is spread mainly by coughing, sneezing, and close contact. Anyone can get flu. Flu strikes suddenly and can last several days. Symptoms vary by age, but can include: 
 fever/chills  sore throat  muscle aches  fatigue  cough  headache  runny or stuffy nose Flu can also lead to pneumonia and blood infections, and cause diarrhea and seizures in children. If you have a medical condition, such as heart or lung disease, flu can make it worse. Flu is more dangerous for some people. Infants and young children, people 72years of age and older, pregnant women, and people with certain health conditions or a weakened immune system are at greatest risk. Each year thousands of people in the [facility] die from flu, and many more are hospitalized. Flu vaccine can: 
 keep you from getting flu, 
 make flu less severe if you do get it, and 
 keep you from spreading flu to your family and other people. 2. Inactivated and recombinant flu vaccines A dose of flu vaccine is recommended every flu season. Children 6 months through 6years of age may need two doses during the same flu season. Everyone else needs only one dose each flu season. Some inactivated flu vaccines contain a very small amount of a mercury-based preservative called thimerosal. Studies have not shown thimerosal in vaccines to be harmful, but flu vaccines that do not contain thimerosal are available. There is no live flu virus in flu shots. They cannot cause the flu. There are many flu viruses, and they are always changing. Each year a new flu vaccine is made to protect against three or four viruses that are likely to cause disease in the upcoming flu season. But even when the vaccine doesnt exactly match these viruses, it may still provide some protection Flu vaccine cannot prevent: 
 flu that is caused by a virus not covered by the vaccine, or 
 illnesses that look like flu but are not. It takes about 2 weeks for protection to develop after vaccination, and protection lasts through the flu season. 3. Some people should not get this vaccine Tell the person who is giving you the vaccine:  If you have any severe, life-threatening allergies.    
If you ever had a life-threatening allergic reaction after a dose of flu vaccine, or have a severe allergy to any part of this vaccine, you may be advised not to get vaccinated. Most, but not all, types of flu vaccine contain a small amount of egg protein.  If you ever had Guillain-Barré Syndrome (also called GBS). Some people with a history of GBS should not get this vaccine. This should be discussed with your doctor.  If you are not feeling well. It is usually okay to get flu vaccine when you have a mild illness, but you might be asked to come back when you feel better. 4. Risks of a vaccine reaction With any medicine, including vaccines, there is a chance of reactions. These are usually mild and go away on their own, but serious reactions are also possible. Most people who get a flu shot do not have any problems with it. Minor problems following a flu shot include:  
 soreness, redness, or swelling where the shot was given  hoarseness  sore, red or itchy eyes  cough  fever  aches  headache  itching  fatigue If these problems occur, they usually begin soon after the shot and last 1 or 2 days. More serious problems following a flu shot can include the following:  There may be a small increased risk of Guillain-Barré Syndrome (GBS) after inactivated flu vaccine. This risk has been estimated at 1 or 2 additional cases per million people vaccinated. This is much lower than the risk of severe complications from flu, which can be prevented by flu vaccine.  Young children who get the flu shot along with pneumococcal vaccine (PCV13) and/or DTaP vaccine at the same time might be slightly more likely to have a seizure caused by fever. Ask your doctor for more information. Tell your doctor if a child who is getting flu vaccine has ever had a seizure. Problems that could happen after any injected vaccine:  People sometimes faint after a medical procedure, including vaccination. Sitting or lying down for about 15 minutes can help prevent fainting, and injuries caused by a fall. Tell your doctor if you feel dizzy, or have vision changes or ringing in the ears.  Some people get severe pain in the shoulder and have difficulty moving the arm where a shot was given. This happens very rarely.  Any medication can cause a severe allergic reaction. Such reactions from a vaccine are very rare, estimated at about 1 in a million doses, and would happen within a few minutes to a few hours after the vaccination. As with any medicine, there is a very remote chance of a vaccine causing a serious injury or death. The safety of vaccines is always being monitored. For more information, visit: www.cdc.gov/vaccinesafety/ 
 
 
The [facility] Vaccine Injury Compensation Program (VICP) is a federal program that was created to compensate people who may have been injured by certain vaccines.  
 
Persons who believe they may have been injured by a vaccine can learn about the program and about filing a claim by calling 2-210.119.7012 or visiting the [employer] website at www.[facility].gov/vaccinecompensation. There is a time limit to file a claim for compensation. 7. How can I learn more?  Ask your healthcare provider. He or she can give you the vaccine package insert or suggest other sources of information.  Call your local or state health department.  Contact the Centers for Disease Control and Prevention (CDC): 
- Call 4-287.906.9873 (1-800-CDC-INFO) or 
- Visit CDCs website at www.cdc.gov/flu Vaccine Information Statement Inactivated Influenza Vaccine 8/7/2015 
42 RADHAMES Brooks 320TM-74 [facility] and JAYS Centers for Disease Control and Prevention Office Use Only Introducing [facility]! Suni Petersen introduces [email or web address] patient portal. Now you can access parts of your medical record, email your doctor's office, and request medication refills online. 1. In your internet browser, go to [email or web address] 2. Click on the First Time User? Click Here link in the Sign In box. You will see the New Member Sign Up page. 3. Enter your [email or web address] Access Code exactly as it appears below. You will not need to use this code after youve completed the sign-up process. If you do not sign up before the expiration date, you must request a new code. · [email or web address] Access Code: [email or web address] Expires: 2/19/2018 12:24 PM 
 
4. Enter the last four digits of your Social Security Number (xxxx) and Date of Birth (mm/dd/yyyy) as indicated and click Submit. You will be taken to the next sign-up page. 5. Create a MoviePasst ID. This will be your [email or web address] login ID and cannot be changed, so think of one that is secure and easy to remember. 6. Create a MoviePasst password. You can change your password at any time. 7. Enter your Password Reset Question and Answer. This can be used at a later time if you forget your password. 8. Enter your e-mail address. You will receive e-mail notification when new information is available in [address] 9. Click Sign Up. You can now view and download portions of your medical record. 10. Click the Download Summary menu link to download a portable copy of your medical information. If you have questions, please visit the Frequently Asked Questions section of the [facility] website. Remember, [facility] is NOT to be used for urgent needs. For medical emergencies, dial 911. Now available from your iPhone and Android! Please provide this summary of care documentation to your next provider. Your primary care clinician is listed as [facility]. If you have any questions after today's visit, please call [phone].
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
## 2018-02-09 NOTE — PROGRESS NOTES
Tiago Singh is a 40 y.o. female presented to clinic for a routine f/u for cholesterol. Pt c/o 8/10 migraine x 1 week that is the same. Pt declined a flu shot when offer due to allergic reaction in the past.    1. Have you been to the ER, urgent care clinic since your last visit? Hospitalized since your last visit? No    2. Have you seen or consulted any other health care providers outside of the [address] [place] [address] since your last visit? Include any pap smears or colon screening. No     Learning Assessment 4/17/2017   PRIMARY LEARNER Patient   HIGHEST LEVEL OF EDUCATION - PRIMARY LEARNER  SOME COLLEGE   BARRIERS PRIMARY LEARNER NONE   CO-LEARNER CAREGIVER No   PRIMARY LANGUAGE ENGLISH    NEED -   LEARNER PREFERENCE PRIMARY DEMONSTRATION     -   LEARNING SPECIAL TOPICS -   ANSWERED BY self   RELATIONSHIP SELF     There are no preventive care reminders to display for this patient. Health Maintenance reviewed - current.

## 2018-08-16 ENCOUNTER — HOSPITAL ENCOUNTER (EMERGENCY)
Age: 43
Discharge: HOME | End: 2018-08-16
Attending: EMERGENCY MEDICINE
Payer: SELF-PAY

## 2018-08-16 VITALS
RESPIRATION RATE: 16 BRPM | BODY MASS INDEX: 35.79 KG/M2 | OXYGEN SATURATION: 98 % | TEMPERATURE: 98.5 F | DIASTOLIC BLOOD PRESSURE: 73 MMHG | HEIGHT: 63 IN | HEART RATE: 70 BPM | WEIGHT: 202 LBS | SYSTOLIC BLOOD PRESSURE: 111 MMHG

## 2018-08-16 DIAGNOSIS — F32.A DEPRESSION, UNSPECIFIED DEPRESSION TYPE: Primary | ICD-10-CM

## 2018-08-16 LAB
AMPHET UR QL SCN: NEGATIVE
ANION GAP SERPL CALC-SCNC: 6 MMOL/L (ref 3–18)
APAP SERPL-MCNC: <2 UG/ML (ref 10–30)
APPEARANCE UR: CLEAR
BACTERIA URNS QL MICRO: NEGATIVE /HPF
BARBITURATES UR QL SCN: POSITIVE
BASOPHILS # BLD: 0 K/UL (ref 0–0.1)
BASOPHILS NFR BLD: 0 % (ref 0–2)
BENZODIAZ UR QL: NEGATIVE
BILIRUB UR QL: NEGATIVE
BUN SERPL-MCNC: 8 MG/DL (ref 7–18)
BUN/CREAT SERPL: 11 (ref 12–20)
CALCIUM SERPL-MCNC: 8.6 MG/DL (ref 8.5–10.1)
CANNABINOIDS UR QL SCN: NEGATIVE
CHLORIDE SERPL-SCNC: 108 MMOL/L (ref 100–108)
CO2 SERPL-SCNC: 28 MMOL/L (ref 21–32)
COCAINE UR QL SCN: NEGATIVE
COLOR UR: YELLOW
CREAT SERPL-MCNC: 0.72 MG/DL (ref 0.6–1.3)
DIFFERENTIAL METHOD BLD: ABNORMAL
EOSINOPHIL # BLD: 0.1 K/UL (ref 0–0.4)
EOSINOPHIL NFR BLD: 1 % (ref 0–5)
EPITH CASTS URNS QL MICRO: NORMAL /LPF (ref 0–5)
ERYTHROCYTE [DISTWIDTH] IN BLOOD BY AUTOMATED COUNT: 12.8 % (ref 11.6–14.5)
ETHANOL SERPL-MCNC: <3 MG/DL (ref 0–3)
GLUCOSE SERPL-MCNC: 99 MG/DL (ref 74–99)
GLUCOSE UR STRIP.AUTO-MCNC: NEGATIVE MG/DL
HCT VFR BLD AUTO: 45.1 % (ref 35–45)
HDSCOM,HDSCOM: ABNORMAL
HGB BLD-MCNC: 15.1 G/DL (ref 12–16)
HGB UR QL STRIP: ABNORMAL
KETONES UR QL STRIP.AUTO: NEGATIVE MG/DL
LEUKOCYTE ESTERASE UR QL STRIP.AUTO: NEGATIVE
LYMPHOCYTES # BLD: 3.3 K/UL (ref 0.9–3.6)
LYMPHOCYTES NFR BLD: 28 % (ref 21–52)
MCH RBC QN AUTO: 29.3 PG (ref 24–34)
MCHC RBC AUTO-ENTMCNC: 33.5 G/DL (ref 31–37)
MCV RBC AUTO: 87.6 FL (ref 74–97)
METHADONE UR QL: NEGATIVE
MONOCYTES # BLD: 0.9 K/UL (ref 0.05–1.2)
MONOCYTES NFR BLD: 8 % (ref 3–10)
NEUTS SEG # BLD: 7.3 K/UL (ref 1.8–8)
NEUTS SEG NFR BLD: 63 % (ref 40–73)
NITRITE UR QL STRIP.AUTO: NEGATIVE
OPIATES UR QL: NEGATIVE
PCP UR QL: NEGATIVE
PH UR STRIP: 5.5 [PH] (ref 5–8)
PLATELET # BLD AUTO: 226 K/UL (ref 135–420)
PMV BLD AUTO: 9.2 FL (ref 9.2–11.8)
POTASSIUM SERPL-SCNC: 3.5 MMOL/L (ref 3.5–5.5)
PROT UR STRIP-MCNC: NEGATIVE MG/DL
RBC # BLD AUTO: 5.15 M/UL (ref 4.2–5.3)
RBC #/AREA URNS HPF: NORMAL /HPF (ref 0–5)
SALICYLATES SERPL-MCNC: 5.3 MG/DL (ref 2.8–20)
SODIUM SERPL-SCNC: 142 MMOL/L (ref 136–145)
SP GR UR REFRACTOMETRY: 1.01 (ref 1–1.03)
UROBILINOGEN UR QL STRIP.AUTO: 0.2 EU/DL (ref 0.2–1)
WBC # BLD AUTO: 11.7 K/UL (ref 4.6–13.2)
WBC URNS QL MICRO: NORMAL /HPF (ref 0–4)

## 2018-08-16 PROCEDURE — 80307 DRUG TEST PRSMV CHEM ANLYZR: CPT | Performed by: NURSE PRACTITIONER

## 2018-08-16 PROCEDURE — 99285 EMERGENCY DEPT VISIT HI MDM: CPT

## 2018-08-16 PROCEDURE — 85025 COMPLETE CBC W/AUTO DIFF WBC: CPT | Performed by: NURSE PRACTITIONER

## 2018-08-16 PROCEDURE — 81001 URINALYSIS AUTO W/SCOPE: CPT | Performed by: NURSE PRACTITIONER

## 2018-08-16 PROCEDURE — 80048 BASIC METABOLIC PNL TOTAL CA: CPT | Performed by: NURSE PRACTITIONER

## 2018-08-16 NOTE — DISCHARGE INSTRUCTIONS
Recovering From Depression: Care Instructions  Your Care Instructions    Taking good care of yourself is important as you recover from depression. In time, your symptoms will fade as your treatment takes hold. Do not give up. Instead, focus your energy on getting better. Your mood will improve. It just takes some time. Focus on things that can help you feel better, such as being with friends and family, eating well, and getting enough rest. But take things slowly. Do not do too much too soon. You will begin to feel better gradually. Follow-up care is a key part of your treatment and safety. Be sure to make and go to all appointments, and call your doctor if you are having problems. It's also a good idea to know your test results and keep a list of the medicines you take. How can you care for yourself at home? Be realistic  · If you have a large task to do, break it up into smaller steps you can handle, and just do what you can. · You may want to put off important decisions until your depression has lifted. If you have plans that will have a major impact on your life, such as marriage, divorce, or a job change, try to wait a bit. Talk it over with friends and loved ones who can help you look at the overall picture first.  · Reaching out to people for help is important. Do not isolate yourself. Let your family and friends help you. Find someone you can trust and confide in, and talk to that person. · Be patient, and be kind to yourself. Remember that depression is not your fault and is not something you can overcome with willpower alone. Treatment is necessary for depression, just like for any other illness. Feeling better takes time, and your mood will improve little by little. Stay active  · Stay busy and get outside. Take a walk, or try some other light exercise. · Talk with your doctor about an exercise program. Exercise can help with mild depression. · Go to a movie or concert.  Take part in a [employer] activity or other social gathering. Go to a [employer] game. · Ask a friend to have dinner with you. Take care of yourself  · Eat a balanced diet with plenty of fresh fruits and vegetables, whole grains, and lean protein. If you have lost your appetite, eat small snacks rather than large meals. · Avoid drinking alcohol or using illegal drugs. Do not take medicines that have not been prescribed for you. They may interfere with medicines you may be taking for depression, or they may make your depression worse. · Take your medicines exactly as they are prescribed. You may start to feel better within 1 to 3 weeks of taking antidepressant medicine. But it can take as many as 6 to 8 weeks to see more improvement. If you have questions or concerns about your medicines, or if you do not notice any improvement by 3 weeks, talk to your doctor. · If you have any side effects from your medicine, tell your doctor. Antidepressants can make you feel tired, dizzy, or nervous. Some people have dry mouth, constipation, headaches, sexual problems, or diarrhea. Many of these side effects are mild and will go away on their own after you have been taking the medicine for a few weeks. Some may last longer. Talk to your doctor if side effects are bothering you too much. You might be able to try a different medicine. · Get enough sleep. If you have problems sleeping:  ¨ Go to bed at the same time every night, and get up at the same time every morning. ¨ Keep your bedroom dark and quiet. ¨ Do not exercise after 5:00 p.m. ¨ Avoid drinks with caffeine after 5:00 p.m. · Avoid sleeping pills unless they are prescribed by the doctor treating your depression. Sleeping pills may make you groggy during the day, and they may interact with other medicine you are taking. · If you have any other illnesses, such as diabetes, heart disease, or high blood pressure, make sure to continue with your treatment.  Tell your doctor about all of the medicines you take, including those with or without a prescription. · Keep the numbers for these national suicide hotlines: 3-947-100-TALK (6-104.846.1447) and 8-641-WHNTDCP (2-350.426.8906). If you or someone you know talks about suicide or feeling hopeless, get help right away. When should you call for help? Call 911 anytime you think you may need emergency care. For example, call if:    · You feel like hurting yourself or someone else.     · Someone you know has depression and is about to attempt or is attempting suicide.   [facility] your doctor now or seek immediate medical care if:    · You hear voices.     · Someone you know has depression and:  ¨ Starts to give away his or her possessions. ¨ Uses illegal drugs or drinks alcohol heavily. ¨ Talks or writes about death, including writing suicide notes or talking about guns, knives, or pills. ¨ Starts to spend a lot of time alone. ¨ Acts very aggressively or suddenly appears calm.    Watch closely for changes in your health, and be sure to contact your doctor if:    · You do not get better as expected. Where can you learn more? Go to http://romario-clover.info/. Enter P937 in the search box to learn more about \"Recovering From Depression: Care Instructions. \"  Current as of: December 7, 2017  Content Version: 11.7  © 2790-3670 [employer]. Care instructions adapted under license by [employer] (which disclaims liability or warranty for this information). If you have questions about a medical condition or this instruction, always ask your healthcare professional. [address] any warranty or liability for your use of this information.

## 2018-08-16 NOTE — ED NOTES
Patient discharged to home. Reviewed discharge information. All questions answered. Patient ambulated independently out of care area.  States she wants to take the bus

## 2018-08-16 NOTE — ED NOTES
Security at bedside to take inventory of patient belongings. Patient signed inventory sheet and confirmed list. Belongings secured in locker #3. SAD score 7, patient will require 1:1 observation with hospital staff. Patient dressed in paper scrubs, equipment in room removed for patient safety.

## 2018-08-16 NOTE — ED NOTES
Purposeful rounding completed:    Side rails up x 1:  YES  Bed in low position and wheels locked: YES  Call bell within reach: NO, patient 1:1 with sitter  Comfort addressed: YES    Toileting needs addressed: YES  Plan of care reviewed/updated with patient and or family members: YES  IV site assessed: N/A  Pain assessed and addressed: YES, 0
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
## 2018-08-16 NOTE — PROGRESS NOTES
Pt noted to be here for \"mentall health problem. \" No psychiatric evaluation noted at this time.            Shelby Sandoval RN, BSN, [facility]  ED Outcomes Manager  Thursdays & Fridays   [phone] (phone)  [phone] (pager)
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
## 2018-08-16 NOTE — ED NOTES
Patient expressing desire to leave facility. Matilde YANCEY and Ed NP explained to patient that she needs to be seen by a psychiatrist before leaving due to statements made regarding wanting to harm herself. Patient made aware that [place] [employer] will be called if patient leaves facility. Patient demanded to know if boyfriend was up front in waiting room. Called registration, informed boyfriend had been here earlier but had left.

## 2018-08-16 NOTE — ED TRIAGE NOTES
Pt told CSB doctor that she wanted to die on her birthday. He called police who says she is voluntary. Pt says she has been off her meds due to no insurance. Pt says won't kill herself due to promise to her son.  Denies HI

## 2018-08-16 NOTE — ED NOTES
4:26 PM :Pt care assumed from Ed, NP, ED provider. Pt complaint(s), current treatment plan, progression and available diagnostic results have been discussed thoroughly. Rounding occurred: yes  Intended Disposition: TBD   Pending diagnostic reports and/or labs (please list): telepsych consult  Abbey Gonzalez PA-C    5:43 PM  Pt evaluated by telepsych. Deems safe for discharge and outpatient CSB follow up. Pt to begin to take her Lamictal 25 mg daily. Given return precautions.     Abbey Gonzalez PA-C

## 2019-06-17 ENCOUNTER — OFFICE VISIT (OUTPATIENT)
Dept: NEUROLOGY | Age: 44
End: 2019-06-17

## 2019-06-17 VITALS
HEIGHT: 63 IN | SYSTOLIC BLOOD PRESSURE: 110 MMHG | BODY MASS INDEX: 40.18 KG/M2 | HEART RATE: 80 BPM | OXYGEN SATURATION: 98 % | TEMPERATURE: 98.4 F | WEIGHT: 226.8 LBS | DIASTOLIC BLOOD PRESSURE: 82 MMHG | RESPIRATION RATE: 22 BRPM

## 2019-06-17 DIAGNOSIS — G43.009 MIGRAINE WITHOUT AURA AND WITHOUT STATUS MIGRAINOSUS, NOT INTRACTABLE: ICD-10-CM

## 2019-06-17 DIAGNOSIS — E66.01 OBESITY, MORBID (HCC): ICD-10-CM

## 2019-06-17 DIAGNOSIS — G43.909 MIGRAINE WITHOUT STATUS MIGRAINOSUS, NOT INTRACTABLE, UNSPECIFIED MIGRAINE TYPE: ICD-10-CM

## 2019-06-17 RX ORDER — NORTRIPTYLINE HYDROCHLORIDE 50 MG/1
100 CAPSULE ORAL
Qty: 60 CAP | Refills: 6 | Status: SHIPPED | OUTPATIENT
Start: 2019-06-17 | End: 2019-08-15 | Stop reason: SDUPTHER

## 2019-06-17 RX ORDER — TOPIRAMATE 200 MG/1
TABLET ORAL
Qty: 60 TAB | Refills: 6 | Status: SHIPPED | OUTPATIENT
Start: 2019-06-17 | End: 2019-08-15 | Stop reason: SDUPTHER

## 2019-06-17 NOTE — PROGRESS NOTES
Bernardo Aguiar is a 44 y.o. female in today for follow-up on migraines. Patient reports active migraine pain 9/10. Learning assessment previously completed 4/17/2017; primary language is [place]. 1. Have you been to the ER, urgent care clinic since your last visit? Hospitalized since your last visit? Yes Reason for visit: [facility] ED 8/16/2018     2. Have you seen or consulted any other health care providers outside of the [address] [place] [address] since your last visit? Include any pap smears or colon screening.  No

## 2019-06-17 NOTE — PROGRESS NOTES
Re:  Tricia Edge up visit     6/17/2019 1:32 PM    SSN: [id number]    Subjective:   Flip Blood returns for follow up of her migraines. She's having nearly daily headaches. Was taken off of the Pamelor because of concern for drug interactions. Now having nearly daily headaches. Medications:    Current Outpatient Medications   Medication Sig Dispense Refill    fluticasone-salmeterol (ADVAIR DISKUS) 250-50 mcg/dose diskus inhaler INHALE ONE DOSE BY MOUTH TWICE DAILY 3 Inhaler 3    ondansetron (ZOFRAN ODT) 4 mg disintegrating tablet Take 1 Tab by mouth every eight (8) hours as needed for Nausea. 15 Tab 0    calcium carbonate (CALCIUM 500) 500 mg calcium (1,250 mg) tablet Take 1 Tab by mouth daily. 30 Tab 5    albuterol (PROVENTIL HFA, VENTOLIN HFA, PROAIR HFA) 90 mcg/actuation inhaler Take 1 Puff by inhalation every four (4) hours as needed for Wheezing. 1 Inhaler 2    simvastatin (ZOCOR) 20 mg tablet TAKE ONE TABLET BY MOUTH NIGHTLY 30 Tab 5    pantoprazole (PROTONIX) 40 mg tablet TAKE ONE TABLET BY MOUTH ONCE DAILY 30 Tab 5    HYDROCODONE/ACETAMINOPHEN (LORTAB  PO) Take  by mouth.  topiramate (TOPAMAX) 200 mg tablet TAKE ONE TABLET BY MOUTH TWICE DAILY  Indications: MIGRAINE PREVENTION 60 Tab 6    butalbital-acetaminophen-caffeine (FIORICET, ESGIC) -40 mg per tablet Take 1 Tab by mouth every six (6) hours as needed for Pain or Headache. 30 Tab 0    lamoTRIgine (LAMICTAL) 25 mg tablet Take 50 mg by mouth nightly.  clonazePAM (KLONOPIN) 0.5 mg tablet Take  by mouth nightly as needed.  docusate sodium (COLACE) 100 mg capsule Take 1 Cap by mouth two (2) times a day. 60 Cap 0    venlafaxine-SR (EFFEXOR-XR) 150 mg capsule Take 150 mg by mouth daily.  nortriptyline (PAMELOR) 50 mg capsule Take 2 Caps by mouth nightly. 60 Cap 6    methocarbamol (ROBAXIN) 500 mg tablet Take 1 Tab by mouth four (4) times daily as needed.  (Patient taking differently: Take 750 mg by mouth four (4) times daily as needed.) 30 Tab 0       Vital signs:    Visit Vitals  /82 (BP 1 Location: Left arm, BP Patient Position: Sitting)   Pulse 80   Temp 98.4 °F (36.9 °C) (Oral)   Resp 22   Ht 5' 3\" (1.6 m)   Wt 102.9 kg (226 lb 12.8 oz)   LMP 09/28/2016 (LMP Unknown)   SpO2 98%   BMI 40.18 kg/m²       Review of Systems:   As above otherwise 11 point review of systems negative including;   Constitutional no fever or chills  Skin denies rash or itching  HEENT  Denies tinnitus, hearing lose  Eyes denies diplopia vision lose  Respiratory denies sortness of breath  Cardiovascular denies chest pain, dyspnea on exertion  Gastrointestinal denies nausea, vomiting, diarrhea, constipation  Genitourinary denies incontinence  Musculoskeletal denies joint pain or swelling  Endocrine denies weight change  Hematology denies easy bruising or bleeding   Neurological as above in HPI      Patient Active Problem List   Diagnosis Code    Asthma J45.909    Migraine G43.909    Hypotension I95.9    Fibromyalgia M79.7    Bipolar depression ([address] F31.9    History of stroke Z86.73    Gastroesophageal reflux disease with esophagitis K21.0    Hyperlipidemia E78.5    Chronic constipation K59.09    Cigarette smoker F17.210    Chronic obstructive pulmonary disease (HCC) J44.9    Obesity, morbid ([address] E66.01         Objective: The patient is awake, alert, and oriented x 4. Fund of knowledge is adequate. Speech is fluent and memory is intact. Cranial Nerves: II - Visual fields are full to confrontation. III, IV, VI - Extraocular movements are intact. There is no nystagmus. V - Facial sensation is intact to pinprick. VII - Face is asymmetrical, she has decreased movement of the left face, but at rest the face is symmetrical.  VIII - Hearing is present. IX, X, XII - Palate is symmetrical.   XI - Shoulder shrugging and head turning intact  Motor:   The patient has 5/5 strength in all 4 limbs.  Sensation is intact to pinprick. Reflexes are 2+ and symmetrical.  Gait is abnormal, she walks antalgic because of back pain. .    CBC:   Lab Results   Component Value Date/Time    WBC 11.7 08/16/2018 03:21 PM    RBC 5.15 08/16/2018 03:21 PM    HGB 15.1 08/16/2018 03:21 PM    HCT 45.1 (H) 08/16/2018 03:21 PM    PLATELET 830 91/82/5164 03:21 PM     BMP:   Lab Results   Component Value Date/Time    Glucose 99 08/16/2018 03:21 PM    Sodium 142 08/16/2018 03:21 PM    Potassium 3.5 08/16/2018 03:21 PM    Chloride 108 08/16/2018 03:21 PM    CO2 28 08/16/2018 03:21 PM    BUN 8 08/16/2018 03:21 PM    Creatinine 0.72 08/16/2018 03:21 PM    Calcium 8.6 08/16/2018 03:21 PM     CMP:   Lab Results   Component Value Date/Time    Glucose 99 08/16/2018 03:21 PM    Sodium 142 08/16/2018 03:21 PM    Potassium 3.5 08/16/2018 03:21 PM    Chloride 108 08/16/2018 03:21 PM    CO2 28 08/16/2018 03:21 PM    BUN 8 08/16/2018 03:21 PM    Creatinine 0.72 08/16/2018 03:21 PM    Calcium 8.6 08/16/2018 03:21 PM    Anion gap 6 08/16/2018 03:21 PM    BUN/Creatinine ratio 11 (L) 08/16/2018 03:21 PM    Alk. phosphatase 83 08/01/2017 09:35 PM    Protein, total 7.0 08/01/2017 09:35 PM    Albumin 3.5 08/01/2017 09:35 PM    Globulin 3.5 08/01/2017 09:35 PM    A-G Ratio 1.0 08/01/2017 09:35 PM     Coagulation:   Lab Results   Component Value Date/Time    Prothrombin time 13.4 09/23/2016 12:18 PM    INR 1.1 09/23/2016 12:18 PM     Cardiac markers:   Lab Results   Component Value Date/Time    CK 75 08/01/2017 09:35 PM    CK-MB Index  08/01/2017 09:35 PM     CALCULATION NOT PERFORMED WHEN RESULT IS BELOW LINEAR LIMIT     Assessment:  Chronic migraine, some compliance issues. Having daily headaches. Resistant to Topamax, Inderal, verapamil. Was doing better on Pamelor, but not taking it currently. Plan: Will restart Pamelor at 100 mg. Renew the Topamax. RTC in 6 weeks. Sincerely,        Jeff Nolan.  Kiley Sullivan M.D.

## 2019-06-17 NOTE — LETTER
6/17/19 Patient: Berna Garcia YOB: 1975 Date of Visit: 6/17/2019 Andrew Magallon NP 
[id number] [id number] VIA In Basket Dear Andrew Magallon NP, Thank you for referring Ms. Fina Shannon to [facility] for evaluation. My notes for this consultation are attached. If you have questions, please do not hesitate to call me. I look forward to following your patient along with you.  
 
 
Sincerely, 
 
Hilda Myers MD

## 2019-08-15 ENCOUNTER — OFFICE VISIT (OUTPATIENT)
Dept: NEUROLOGY | Age: 44
End: 2019-08-15

## 2019-08-15 VITALS
BODY MASS INDEX: 40.4 KG/M2 | HEART RATE: 82 BPM | WEIGHT: 228 LBS | SYSTOLIC BLOOD PRESSURE: 100 MMHG | DIASTOLIC BLOOD PRESSURE: 60 MMHG | OXYGEN SATURATION: 98 % | TEMPERATURE: 98.2 F | HEIGHT: 63 IN | RESPIRATION RATE: 18 BRPM

## 2019-08-15 DIAGNOSIS — G43.009 MIGRAINE WITHOUT AURA AND WITHOUT STATUS MIGRAINOSUS, NOT INTRACTABLE: Primary | ICD-10-CM

## 2019-08-15 DIAGNOSIS — G43.909 MIGRAINE WITHOUT STATUS MIGRAINOSUS, NOT INTRACTABLE, UNSPECIFIED MIGRAINE TYPE: ICD-10-CM

## 2019-08-15 DIAGNOSIS — E66.01 CLASS 3 SEVERE OBESITY WITH BODY MASS INDEX (BMI) OF 40.0 TO 44.9 IN ADULT, UNSPECIFIED OBESITY TYPE, UNSPECIFIED WHETHER SERIOUS COMORBIDITY PRESENT (HCC): ICD-10-CM

## 2019-08-15 DIAGNOSIS — R06.81 WITNESSED APNEIC SPELLS: ICD-10-CM

## 2019-08-15 DIAGNOSIS — F11.90 CHRONIC NARCOTIC USE: ICD-10-CM

## 2019-08-15 DIAGNOSIS — R06.83 SNORING: ICD-10-CM

## 2019-08-15 RX ORDER — TOPIRAMATE 200 MG/1
TABLET ORAL
Qty: 60 TAB | Refills: 6 | Status: SHIPPED | OUTPATIENT
Start: 2019-08-15 | End: 2020-02-19

## 2019-08-15 RX ORDER — NORTRIPTYLINE HYDROCHLORIDE 50 MG/1
100 CAPSULE ORAL
Qty: 60 CAP | Refills: 6 | Status: SHIPPED | OUTPATIENT
Start: 2019-08-15 | End: 2020-02-19
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
## 2019-08-15 NOTE — Clinical Note
8/15/19 Patient: Joan Black YOB: 1975 Date of Visit: 8/15/2019 Bryson Rock MD 
[id number] [facility] [address] [id number] VIA Facsimile: [phone] Dear Bryson Rock MD, Thank you for referring Ms. Igor Hogue to [address] [place] [address] for evaluation. My notes for this consultation are attached. If you have questions, please do not hesitate to call me. I look forward to following your patient along with you. Sincerely, Anil Cordero NP

## 2019-08-15 NOTE — PATIENT INSTRUCTIONS
Thank you for choosing [facility] and [facility] for your     care. You may receive a survey about your visit. We appreciate you taking time     to complete this survey as we use your feedback to improve our services. We     realize we are not perfect, but we strive to provide excellent care.

## 2019-09-10 ENCOUNTER — HOSPITAL ENCOUNTER (OUTPATIENT)
Dept: ULTRASOUND IMAGING | Age: 44
Discharge: HOME | End: 2019-09-10
Attending: FAMILY MEDICINE
Payer: MEDICAID

## 2019-09-10 DIAGNOSIS — R22.42 LUMP OF LEFT THIGH: ICD-10-CM

## 2019-09-10 PROCEDURE — 76882 US LMTD JT/FCL EVL NVASC XTR: CPT

## 2019-09-23 ENCOUNTER — HOSPITAL ENCOUNTER (OUTPATIENT)
Dept: SLEEP MEDICINE | Age: 44
Discharge: HOME | End: 2019-09-23
Payer: MEDICAID

## 2019-09-23 DIAGNOSIS — F11.90 CHRONIC NARCOTIC USE: ICD-10-CM

## 2019-09-23 DIAGNOSIS — E66.01 CLASS 3 SEVERE OBESITY WITH BODY MASS INDEX (BMI) OF 40.0 TO 44.9 IN ADULT, UNSPECIFIED OBESITY TYPE, UNSPECIFIED WHETHER SERIOUS COMORBIDITY PRESENT (HCC): ICD-10-CM

## 2019-09-23 DIAGNOSIS — G43.009 MIGRAINE WITHOUT AURA AND WITHOUT STATUS MIGRAINOSUS, NOT INTRACTABLE: ICD-10-CM

## 2019-09-23 DIAGNOSIS — R06.81 WITNESSED APNEIC SPELLS: ICD-10-CM

## 2019-09-23 DIAGNOSIS — R06.83 SNORING: ICD-10-CM

## 2019-09-23 PROCEDURE — 95810 POLYSOM 6/> YRS 4/> PARAM: CPT

## 2019-09-24 VITALS — HEART RATE: 83 BPM | SYSTOLIC BLOOD PRESSURE: 110 MMHG | DIASTOLIC BLOOD PRESSURE: 81 MMHG

## 2020-06-22 ENCOUNTER — VIRTUAL VISIT (OUTPATIENT)
Dept: NEUROLOGY | Age: 45
End: 2020-06-22

## 2020-06-22 DIAGNOSIS — F11.90 CHRONIC NARCOTIC USE: ICD-10-CM

## 2020-06-22 DIAGNOSIS — G47.33 OSA (OBSTRUCTIVE SLEEP APNEA): ICD-10-CM

## 2020-06-22 DIAGNOSIS — G43.909 MIGRAINE WITHOUT STATUS MIGRAINOSUS, NOT INTRACTABLE, UNSPECIFIED MIGRAINE TYPE: Primary | ICD-10-CM

## 2020-06-22 DIAGNOSIS — G43.009 MIGRAINE WITHOUT AURA AND WITHOUT STATUS MIGRAINOSUS, NOT INTRACTABLE: ICD-10-CM

## 2020-06-22 RX ORDER — NORTRIPTYLINE HYDROCHLORIDE 50 MG/1
100 CAPSULE ORAL
Qty: 60 CAP | Refills: 3 | Status: SHIPPED | OUTPATIENT
Start: 2020-06-22 | End: 2020-10-21

## 2020-06-22 RX ORDER — TOPIRAMATE 200 MG/1
200 TABLET ORAL 2 TIMES DAILY
Qty: 60 TAB | Refills: 3 | Status: SHIPPED | OUTPATIENT
Start: 2020-06-22 | End: 2020-10-07
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
## 2020-06-22 NOTE — PROGRESS NOTES
Shy Claudio is a 38 y.o. female on virtual visit today for follow-up on migraines. Mobile number [phone] will be used for today's visit. 1. Have you been to the ER, urgent care clinic since your last visit? Hospitalized since your last visit? No    2. Have you seen or consulted any other health care providers outside of the [address] [place] [address] since your last visit? Include any pap smears or colon screening.  No

## 2020-06-29 ENCOUNTER — VIRTUAL VISIT (OUTPATIENT)
Dept: NEUROLOGY | Age: 45
End: 2020-06-29

## 2020-06-29 NOTE — PROGRESS NOTES
Renae Goodell is a 38 y.o. female on virtual visit today to discuss migraines and SHEY; as referred by Lamont Lopez NP. Mobile number [phone] will be used for today's visit. 1. Have you been to the ER, urgent care clinic since your last visit? Hospitalized since your last visit? No    2. Have you seen or consulted any other health care providers outside of the [address] [place] [address] since your last visit? Include any pap smears or colon screening.  No

## 2020-06-29 NOTE — PROGRESS NOTES
6/29/2020 2:07 PM    SSN: [id number]      HPI:  39 y/o female referred by Marah Woodard NP for evaluation of mild SHEY. She is followed up at this clinic d/t history of chronic, intractable migraines. Symptoms of snoring, fatigue and her uncontrolled migraines led to a PSA Sept 23, 2019 showing evidence of SHEY with an AHi of 5, REM AHI of 16, desats to 77%, and a baseline O2 at 88% requiring 1% of supplemental O2. She never had a f/u CPAP for this. She has h/o COPD. She has had a h/o migraine headaches since age 14. She currently has them several days out of the week, she often wakes up with them, the may last all day, they are in different places and often with phonophobia and photophobia. She has h/o bipolar disorder. She has tried multiple meds in the past including topamax, muscle relaxants, nortriptyline, amitryptiline with sub-optimal relief. She is seen by pain management, who supplies prn pain meds for back pain and she uses these for headaches as well, currently including lortab regularly, skelaxin (used to be robaxin), and prn fioricet. Five different invitations for virtual visits for a virtual visit were sent it to is the patient did not respond. Staff will contact her to reschedule.

## 2020-07-01 ENCOUNTER — VIRTUAL VISIT (OUTPATIENT)
Dept: NEUROLOGY | Age: 45
End: 2020-07-01
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
## 2020-10-06 DIAGNOSIS — G43.009 MIGRAINE WITHOUT AURA AND WITHOUT STATUS MIGRAINOSUS, NOT INTRACTABLE: ICD-10-CM

## 2020-10-07 RX ORDER — TOPIRAMATE 200 MG/1
TABLET ORAL
Qty: 60 TAB | Refills: 3 | Status: SHIPPED | OUTPATIENT
Start: 2020-10-07 | End: 2021-02-24

## 2021-01-30 DIAGNOSIS — G43.009 MIGRAINE WITHOUT AURA AND WITHOUT STATUS MIGRAINOSUS, NOT INTRACTABLE: ICD-10-CM

## 2021-02-01 RX ORDER — NORTRIPTYLINE HYDROCHLORIDE 50 MG/1
CAPSULE ORAL
Qty: 60 CAP | Refills: 1 | Status: SHIPPED | OUTPATIENT
Start: 2021-02-01 | End: 2021-04-21

## 2021-04-21 ENCOUNTER — VIRTUAL VISIT (OUTPATIENT)
Dept: NEUROLOGY | Age: 46
End: 2021-04-21
Payer: MEDICAID

## 2021-04-21 DIAGNOSIS — G43.719 INTRACTABLE CHRONIC MIGRAINE WITHOUT AURA AND WITHOUT STATUS MIGRAINOSUS: Primary | ICD-10-CM

## 2021-04-21 DIAGNOSIS — G47.33 OSA (OBSTRUCTIVE SLEEP APNEA): ICD-10-CM

## 2021-04-21 DIAGNOSIS — G43.009 MIGRAINE WITHOUT AURA AND WITHOUT STATUS MIGRAINOSUS, NOT INTRACTABLE: ICD-10-CM

## 2021-04-21 PROCEDURE — 99213 OFFICE O/P EST LOW 20 MIN: CPT | Performed by: NURSE PRACTITIONER

## 2021-04-21 RX ORDER — GALCANEZUMAB 120 MG/ML
120 INJECTION, SOLUTION SUBCUTANEOUS
Qty: 2 SYRINGE | Refills: 3 | Status: SHIPPED | OUTPATIENT
Start: 2021-04-21

## 2021-04-21 RX ORDER — TOPIRAMATE 200 MG/1
TABLET ORAL
Qty: 60 TAB | Refills: 5 | Status: SHIPPED | OUTPATIENT
Start: 2021-04-21 | End: 2021-05-12
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
## 2021-04-21 NOTE — PROGRESS NOTES
Samantha Hughes is a 41 y.o. female who will be using a cell phone for today's VV. 1. Have you been to the ER, urgent care clinic since your last visit? Hospitalized since your last visit? No    2. Have you seen or consulted any other health care providers outside of the [address] [place] [address] since your last visit? Include any pap smears or colon screening.  No        This will be the cell phone number [phone]

## 2021-11-09 DIAGNOSIS — G43.009 MIGRAINE WITHOUT AURA AND WITHOUT STATUS MIGRAINOSUS, NOT INTRACTABLE: ICD-10-CM

## 2021-11-09 RX ORDER — NORTRIPTYLINE HYDROCHLORIDE 50 MG/1
CAPSULE ORAL
Qty: 60 CAPSULE | Refills: 1 | Status: SHIPPED | OUTPATIENT
Start: 2021-11-09 | End: 2022-01-03

## 2021-11-09 NOTE — LETTER
2/24/2017 2:44 PM 
 
Patient:  Sheldon Caballero YOB: 1975 Date of Visit: 2/24/2017 Dear Zeke Chu, VENICE 
[address] [address] VIA In Basket 
 : Thank you for referring Ms. Roldan Perez to me for evaluation/treatment. Below are the relevant portions of my assessment and plan of care. Re:  Anny Ziegler up visit     2/24/2017 2:29 PM 
 
 
 
SSN: [id number] Subjective:   Sheldon Caballero returns for follow up of her migraines. She started to have a headache on February 4. She sates prior to that the pain was less intense. She's recently started to get woken up in the middle of the night with pain. This is new in the last several weeks. The pain is mostly right sided. She went to [facility] because of weakness of the left side with some sensory changes. Medications:   
Current Outpatient Prescriptions Medication Sig Dispense Refill  ADVAIR DISKUS 250-50 mcg/dose diskus inhaler INHALE ONE DOSE BY MOUTH TWICE DAILY 1 Inhaler 3  
 butalbital-acetaminophen-caffeine (FIORICET, ESGIC) -40 mg per tablet Take 1 Tab by mouth every six (6) hours as needed for Pain or Headache. 20 Tab 0  
 simvastatin (ZOCOR) 20 mg tablet Take 1 Tab by mouth nightly. 30 Tab 2  
 topiramate (TOPAMAX) 200 mg tablet TAKE ONE TABLET BY MOUTH TWICE DAILY  Indications: MIGRAINE PREVENTION 60 Tab 6  clonazePAM (KLONOPIN) 0.5 mg tablet Take  by mouth nightly as needed.  pantoprazole (PROTONIX) 40 mg tablet Take 1 Tab by mouth daily. 30 Tab 3  
 docusate sodium (COLACE) 100 mg capsule Take 1 Cap by mouth two (2) times a day. 60 Cap 0  
 venlafaxine-SR (EFFEXOR-XR) 150 mg capsule Take 150 mg by mouth daily.  albuterol (PROVENTIL HFA, VENTOLIN HFA, PROAIR HFA) 90 mcg/actuation inhaler Take 2 Puffs by inhalation every four (4) hours as needed for Wheezing.  1 Inhaler 2  
 Health Maintenance Due   Topic Date Due   • HIB Vaccine (3 of 3 - PRP-OMP Series) 11/07/2021   • Pneumococcal Vaccine 0-64 (4 of 4) 11/07/2021       Patient is due for topics as listed above but is not proceeding with Immunization(s) HIB and Pneumococcal at this time. Patient is following pediatric unified immunization schedule for immunizations.      Vaccine Information Statement(s) or the Emergency Use Authorization was given today. This has been reviewed, questions answered, and verbal consent given by Parent for injection(s) and administration of Hepatitis A, Influenza (Inactivated), Measles/Mumps/Rubella (MMR) and Varicella-Chickenpox.        Patient tolerated without incident. See immunization grid for documentation.    1. Does the patient have a moderate to severe fever?  No  2. Has the patient had a serious reaction to a flu shot before?   No  3. Has the patient ever had Guillian Eldred Syndrome within 6 weeks of a previous flu shot?  No  4. Is the patient less than 6 months of age?  No    Patient is eligible to receive the vaccine based on all questions being answered as 'No'.      methocarbamol (ROBAXIN) 500 mg tablet Take 1 Tab by mouth four (4) times daily as needed. 30 Tab 0 Vital signs:   
Visit Vitals  /80  Pulse 76  Temp 98 °F (36.7 °C) (Oral)  Ht 5' 5\" (1.651 m)  Wt 78.7 kg (173 lb 9.6 oz)  LMP 09/28/2016  SpO2 98%  BMI 28.89 kg/m2 Review of Systems: As above otherwise 11 point review of systems negative including;  
Constitutional no fever or chills Skin denies rash or itching HEENT  Denies tinnitus, hearing lose Eyes denies diplopia vision lose Respiratory denies sortness of breath Cardiovascular denies chest pain, dyspnea on exertion Gastrointestinal denies nausea, vomiting, diarrhea, constipation Genitourinary denies incontinence Musculoskeletal denies joint pain or swelling Endocrine denies weight change Hematology denies easy bruising or bleeding Neurological as above in HPI Patient Active Problem List  
Diagnosis Code  Asthma J45.909  Migraine G43.909  Hypotension I95.9  Fibromyalgia M79.7  Bipolar depression ([facility]) F31.30  
 History of stroke Z86.73  
 Gastroesophageal reflux disease with esophagitis K21.0  Hyperlipidemia E78.5  Chronic constipation K59.09  
 Anemia D64.9  Chronic obstructive pulmonary disease with acute exacerbation ([facility]) J44.1  Cigarette smoker F17.210 Objective: The patient is awake, alert, and oriented x 4. Fund of knowledge is adequate. Speech is fluent and memory is intact. Cranial Nerves: II  Visual fields are full to confrontation. III, IV, VI  Extraocular movements are intact. There is no nystagmus. V  Facial sensation is intact to pinprick. VII  Face is asymmetrical, she has decreased movement of the left face, but at rest the face is symmetrical.  VIII - Hearing is present. IX, X, XII  Palate is symmetrical.   XI - Shoulder shrugging and head turning intact Motor: The patient has 2/5 strength throughout the left side .  Tone is normal. Reflexes are 2+ and symmetrical. Plantars are down going. Gait is abnormal, she limps off of the left leg, but does not fall. CBC:  
Lab Results Component Value Date/Time WBC 9.4 10/24/2016 03:00 PM  
 RBC 4.49 10/24/2016 03:00 PM  
 HGB 12.3 10/24/2016 03:00 PM  
 HCT 39.0 10/24/2016 03:00 PM  
 PLATELET 589 13/01/8405 03:00 PM  
 
BMP:  
Lab Results Component Value Date/Time Glucose 79 01/05/2017 12:02 PM  
 Sodium 143 01/05/2017 12:02 PM  
 Potassium 4.3 01/05/2017 12:02 PM  
 Chloride 111 01/05/2017 12:02 PM  
 CO2 24 01/05/2017 12:02 PM  
 BUN 8 01/05/2017 12:02 PM  
 Creatinine 0.89 01/05/2017 12:02 PM  
 Calcium 9.0 01/05/2017 12:02 PM  
 
CMP:  
Lab Results Component Value Date/Time Glucose 79 01/05/2017 12:02 PM  
 Sodium 143 01/05/2017 12:02 PM  
 Potassium 4.3 01/05/2017 12:02 PM  
 Chloride 111 01/05/2017 12:02 PM  
 CO2 24 01/05/2017 12:02 PM  
 BUN 8 01/05/2017 12:02 PM  
 Creatinine 0.89 01/05/2017 12:02 PM  
 Calcium 9.0 01/05/2017 12:02 PM  
 Anion gap 8 01/05/2017 12:02 PM  
 BUN/Creatinine ratio 9 01/05/2017 12:02 PM  
 Alk. phosphatase 86 09/23/2016 12:18 PM  
 Protein, total 7.1 09/23/2016 12:18 PM  
 Albumin 3.6 09/23/2016 12:18 PM  
 Globulin 3.5 09/23/2016 12:18 PM  
 A-G Ratio 1.0 09/23/2016 12:18 PM  
 
Coagulation:  
Lab Results Component Value Date/Time Prothrombin time 13.4 09/23/2016 12:18 PM  
 INR 1.1 09/23/2016 12:18 PM  
 
Cardiac markers:  
Lab Results Component Value Date/Time  10/30/2015 01:30 PM  
 CK-MB Index 0.5 10/30/2015 01:30 PM  
 
 
Assessment:  Chronic migraine, some compliance issues. Sounds like the Topamax was working, but now with significant worsening of her headache and left sided weakness. Has a normal CT scan. I'm concerned she is embellishing, but there's definitive left sided weakness. Plan: Will get a STAT MRI of the brain.   If negative, this is likely to be factitious. If positive, may need admission for workup. Will also start low dose Pamelor to try to help with sleep and the pain. Return here next week. Sincerely, 
 
 
 
Shena Caceres. Saige Martinez M.D. If you have questions, please do not hesitate to call me. I look forward to following Ms. Inés Rasmussen along with you.  
 
 
 
Sincerely, 
 
 
Radha Arvizu MD

## 2021-12-29 DIAGNOSIS — G43.009 MIGRAINE WITHOUT AURA AND WITHOUT STATUS MIGRAINOSUS, NOT INTRACTABLE: ICD-10-CM

## 2022-01-03 RX ORDER — NORTRIPTYLINE HYDROCHLORIDE 50 MG/1
CAPSULE ORAL
Qty: 60 CAPSULE | Refills: 1 | Status: SHIPPED | OUTPATIENT
Start: 2022-01-03 | End: 2022-02-23

## 2022-02-22 DIAGNOSIS — G43.009 MIGRAINE WITHOUT AURA AND WITHOUT STATUS MIGRAINOSUS, NOT INTRACTABLE: ICD-10-CM

## 2022-02-23 RX ORDER — NORTRIPTYLINE HYDROCHLORIDE 50 MG/1
CAPSULE ORAL
Qty: 60 CAPSULE | Refills: 1 | Status: SHIPPED | OUTPATIENT
Start: 2022-02-23 | End: 2022-04-17

## 2022-08-25 DIAGNOSIS — G43.009 MIGRAINE WITHOUT AURA AND WITHOUT STATUS MIGRAINOSUS, NOT INTRACTABLE: ICD-10-CM

## 2022-08-29 RX ORDER — NORTRIPTYLINE HYDROCHLORIDE 50 MG/1
CAPSULE ORAL
Qty: 60 CAPSULE | Refills: 0 | OUTPATIENT
Start: 2022-08-29

## 2023-03-23 NOTE — TELEPHONE ENCOUNTER
Requested Prescriptions     Pending Prescriptions Disp Refills    calcium carbonate (CALCIUM 500) 500 mg calcium (1,250 mg) tablet 30 Tab 5     Sig: Take 1 Tab by mouth daily.
ED/NOEL/Abdias
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
